# Patient Record
Sex: FEMALE | Race: WHITE | NOT HISPANIC OR LATINO | Employment: OTHER | URBAN - METROPOLITAN AREA
[De-identification: names, ages, dates, MRNs, and addresses within clinical notes are randomized per-mention and may not be internally consistent; named-entity substitution may affect disease eponyms.]

---

## 2017-01-12 ENCOUNTER — OFFICE VISIT (OUTPATIENT)
Dept: RETAIL CLINIC | Facility: CLINIC | Age: 62
End: 2017-01-12

## 2017-01-12 VITALS
SYSTOLIC BLOOD PRESSURE: 120 MMHG | HEART RATE: 68 BPM | RESPIRATION RATE: 20 BRPM | OXYGEN SATURATION: 98 % | TEMPERATURE: 97.1 F | DIASTOLIC BLOOD PRESSURE: 78 MMHG

## 2017-01-12 DIAGNOSIS — H65.03 BILATERAL ACUTE SEROUS OTITIS MEDIA, RECURRENCE NOT SPECIFIED: Primary | ICD-10-CM

## 2017-01-12 PROCEDURE — 99213 OFFICE O/P EST LOW 20 MIN: CPT | Performed by: NURSE PRACTITIONER

## 2017-01-12 RX ORDER — CETIRIZINE HYDROCHLORIDE 10 MG/1
10 TABLET ORAL DAILY
Qty: 14 TABLET | Refills: 0
Start: 2017-01-12 | End: 2017-01-26

## 2017-01-12 NOTE — PROGRESS NOTES
Subjective   Vero Pagan is a 61 y.o. female.     HPI Comments: Patient reports uncontrolled allergies intermittently over the last few months. Ear symptoms began 1 month ago. Has been diagnosed with serous otitis in the past and cannot tolerate fluticasone. Started taking unknown antihistamine last week and took it daily for 4-5 days without symptom relief so she stopped use. Also applied Vicks vapor rub into ear canals with a qtip without symptom relief. She is traveling via plane tomorrow and wanted to have her ears looked at before she left the country.    Earache    There is pain in both ears. This is a new problem. Episode onset: 1 month ago. The problem occurs constantly. The problem has been unchanged. There has been no fever. Associated symptoms include rhinorrhea. Pertinent negatives include no abdominal pain, coughing, diarrhea, ear discharge, headaches, hearing loss, neck pain, rash, sore throat or vomiting. Treatments tried: unknown antihistamine. The treatment provided no relief. There is no history of a chronic ear infection or hearing loss.       The following portions of the patient's history were reviewed and updated as appropriate: allergies, current medications, past family history, past medical history, past social history, past surgical history and problem list.    Review of Systems   Constitutional: Negative for appetite change, chills, diaphoresis, fatigue and fever.   HENT: Positive for congestion, ear pain and rhinorrhea. Negative for ear discharge, facial swelling, hearing loss, mouth sores, nosebleeds, postnasal drip, sinus pressure, sneezing, sore throat, tinnitus, trouble swallowing and voice change.    Eyes: Negative for pain, discharge, redness and itching.   Respiratory: Negative for cough, chest tightness, shortness of breath, wheezing and stridor.    Cardiovascular: Negative for chest pain and palpitations.   Gastrointestinal: Negative for abdominal pain, constipation, diarrhea,  nausea and vomiting.   Genitourinary: Negative for decreased urine volume.   Musculoskeletal: Negative for myalgias and neck pain.   Skin: Negative for rash.   Allergic/Immunologic: Positive for environmental allergies.   Neurological: Negative for dizziness, syncope, weakness and headaches.       Objective   Physical Exam   Constitutional: She is oriented to person, place, and time. She appears well-developed and well-nourished. She is cooperative.  Non-toxic appearance. She does not appear ill. No distress.   HENT:   Right Ear: Hearing, external ear and ear canal normal. Tympanic membrane is not scarred, not perforated, not erythematous, not retracted and not bulging. A middle ear effusion is present.   Left Ear: Hearing, external ear and ear canal normal. Tympanic membrane is not scarred, not perforated, not erythematous, not retracted and not bulging. A middle ear effusion is present.   Nose: Nose normal. Right sinus exhibits no maxillary sinus tenderness and no frontal sinus tenderness. Left sinus exhibits no maxillary sinus tenderness and no frontal sinus tenderness.   Mouth/Throat: Uvula is midline, oropharynx is clear and moist and mucous membranes are normal. Tonsils are 0 on the right. Tonsils are 0 on the left.   Eyes: Conjunctivae and lids are normal.   Cardiovascular: Normal rate, regular rhythm, S1 normal and S2 normal.    Pulmonary/Chest: Effort normal and breath sounds normal.   Abdominal: Soft. Normal appearance and bowel sounds are normal. There is no tenderness.   Lymphadenopathy:     She has no cervical adenopathy.   Neurological: She is alert and oriented to person, place, and time.   Skin: Skin is warm and dry. She is not diaphoretic. No pallor.   Vitals reviewed.      Assessment/Plan   Vero was seen today for earache.    Diagnoses and all orders for this visit:    Bilateral acute serous otitis media, recurrence not specified    Other orders  -     cetirizine (zyrTEC) 10 MG tablet; Take 1  tablet by mouth Daily for 14 days.        -     Follow up with PCP for persistent or worsening symptoms

## 2017-01-12 NOTE — PATIENT INSTRUCTIONS
Serous Otitis Media  Serous otitis media is fluid in the middle ear space. This space contains the bones for hearing and air. Air in the middle ear space helps to transmit sound.   The air gets there through the eustachian tube. This tube goes from the back of the nose (nasopharynx) to the middle ear space. It keeps the pressure in the middle ear the same as the outside world. It also helps to drain fluid from the middle ear space.  CAUSES   Serous otitis media occurs when the eustachian tube gets blocked. Blockage can come from:  · Ear infections.  · Colds and other upper respiratory infections.  · Allergies.  · Irritants such as cigarette smoke.  · Sudden changes in air pressure (such as descending in an airplane).  · Enlarged adenoids.  · A mass in the nasopharynx.  During colds and upper respiratory infections, the middle ear space can become temporarily filled with fluid. This can happen after an ear infection also. Once the infection clears, the fluid will generally drain out of the ear through the eustachian tube. If it does not, then serous otitis media occurs.  SIGNS AND SYMPTOMS   · Hearing loss.  · A feeling of fullness in the ear, without pain.  · Young children may not show any symptoms but may show slight behavioral changes, such as agitation, ear pulling, or crying.  DIAGNOSIS   Serous otitis media is diagnosed by an ear exam. Tests may be done to check on the movement of the eardrum. Hearing exams may also be done.  TREATMENT   The fluid most often goes away without treatment. If allergy is the cause, allergy treatment may be helpful. Fluid that persists for several months may require minor surgery. A small tube is placed in the eardrum to:  · Drain the fluid.  · Restore the air in the middle ear space.  In certain situations, antibiotic medicines are used to avoid surgery. Surgery may be done to remove enlarged adenoids (if this is the cause).  HOME CARE INSTRUCTIONS   · Keep children away from  tobacco smoke.  · Keep all follow-up visits as directed by your health care provider.  SEEK MEDICAL CARE IF:   · Your hearing is not better in 3 months.  · Your hearing is worse.  · You have ear pain.  · You have drainage from the ear.  · You have dizziness.  · You have serous otitis media only in one ear or have any bleeding from your nose (epistaxis).  · You notice a lump on your neck.  MAKE SURE YOU:  · Understand these instructions.    · Will watch your condition.    · Will get help right away if you are not doing well or get worse.       This information is not intended to replace advice given to you by your health care provider. Make sure you discuss any questions you have with your health care provider.     Document Released: 03/09/2005 Document Revised: 01/08/2016 Document Reviewed: 07/15/2014  [employer] Interactive Patient Education ©2016 [employer]

## 2017-03-06 ENCOUNTER — APPOINTMENT (OUTPATIENT)
Dept: WOMENS IMAGING | Facility: HOSPITAL | Age: 62
End: 2017-03-06

## 2017-03-06 PROCEDURE — 77067 SCR MAMMO BI INCL CAD: CPT | Performed by: RADIOLOGY

## 2017-03-06 PROCEDURE — 77063 BREAST TOMOSYNTHESIS BI: CPT | Performed by: RADIOLOGY

## 2017-08-10 ENCOUNTER — OFFICE VISIT (OUTPATIENT)
Dept: RETAIL CLINIC | Facility: CLINIC | Age: 62
End: 2017-08-10

## 2017-08-10 VITALS
TEMPERATURE: 98.8 F | RESPIRATION RATE: 18 BRPM | DIASTOLIC BLOOD PRESSURE: 80 MMHG | SYSTOLIC BLOOD PRESSURE: 140 MMHG | HEART RATE: 72 BPM | OXYGEN SATURATION: 98 %

## 2017-08-10 DIAGNOSIS — H66.002 ACUTE SUPPURATIVE OTITIS MEDIA OF LEFT EAR WITHOUT SPONTANEOUS RUPTURE OF TYMPANIC MEMBRANE, RECURRENCE NOT SPECIFIED: Primary | ICD-10-CM

## 2017-08-10 PROBLEM — H92.02 LEFT EAR PAIN: Status: ACTIVE | Noted: 2017-08-10

## 2017-08-10 PROCEDURE — 99213 OFFICE O/P EST LOW 20 MIN: CPT | Performed by: NURSE PRACTITIONER

## 2017-08-10 RX ORDER — AMOXICILLIN 875 MG/1
875 TABLET, COATED ORAL 2 TIMES DAILY
Qty: 20 TABLET | Refills: 0 | Status: SHIPPED | OUTPATIENT
Start: 2017-08-10 | End: 2017-09-11

## 2017-08-10 NOTE — PROGRESS NOTES
Subjective   Vero Pagan is a 62 y.o. female.     Earache    There is pain in the left ear. This is a new problem. The current episode started in the past 7 days. The maximum temperature recorded prior to her arrival was 100.4 - 100.9 F. The fever has been present for 1 to 2 days. The pain is at a severity of 5/10. The pain is moderate. Pertinent negatives include no coughing or hearing loss. Associated symptoms comments: Left ear muffled hearing. She has tried nothing for the symptoms.        The following portions of the patient's history were reviewed and updated as appropriate: allergies, current medications, past family history, past medical history, past social history, past surgical history and problem list.    Review of Systems   Constitutional: Positive for fever.   HENT: Positive for ear pain and postnasal drip. Negative for hearing loss.         Left ear    Eyes: Negative.    Respiratory: Negative.  Negative for cough.    Cardiovascular: Negative.    Gastrointestinal: Negative.        Objective   Physical Exam   Constitutional: She appears well-developed and well-nourished.   HENT:   Right Ear: External ear normal.   Left Ear: Tympanic membrane is injected, erythematous and bulging. Tympanic membrane mobility is abnormal.   Eyes: Pupils are equal, round, and reactive to light.   Neck: Normal range of motion.   Cardiovascular: Normal rate and normal heart sounds.    Pulmonary/Chest: Effort normal and breath sounds normal. She has no decreased breath sounds. She has no wheezes. She has no rhonchi. She has no rales.   Abdominal: Soft. Bowel sounds are normal.   Nursing note and vitals reviewed.      Assessment/Plan   Vero was seen today for earache and fever.    Diagnoses and all orders for this visit:    Acute suppurative otitis media of left ear without spontaneous rupture of tympanic membrane, recurrence not specified  -     amoxicillin (AMOXIL) 875 MG tablet; Take 1 tablet by mouth 2 (Two) Times a  Day.      Talked to the patient about the diagnosis and educate the patient and advise to visit to PCP if the symptoms worsens

## 2017-08-10 NOTE — PATIENT INSTRUCTIONS

## 2017-09-11 ENCOUNTER — OFFICE VISIT (OUTPATIENT)
Dept: ORTHOPEDIC SURGERY | Facility: CLINIC | Age: 62
End: 2017-09-11

## 2017-09-11 VITALS
WEIGHT: 215 LBS | HEIGHT: 70 IN | BODY MASS INDEX: 30.78 KG/M2 | HEART RATE: 72 BPM | DIASTOLIC BLOOD PRESSURE: 88 MMHG | SYSTOLIC BLOOD PRESSURE: 143 MMHG

## 2017-09-11 DIAGNOSIS — S69.82XA TFCC (TRIANGULAR FIBROCARTILAGE COMPLEX) INJURY, LEFT, INITIAL ENCOUNTER: Primary | ICD-10-CM

## 2017-09-11 PROCEDURE — 99203 OFFICE O/P NEW LOW 30 MIN: CPT | Performed by: NURSE PRACTITIONER

## 2017-09-11 PROCEDURE — 20605 DRAIN/INJ JOINT/BURSA W/O US: CPT | Performed by: NURSE PRACTITIONER

## 2017-09-11 RX ADMIN — BUPIVACAINE HYDROCHLORIDE 1 ML: 5 INJECTION, SOLUTION EPIDURAL; INTRACAUDAL at 15:43

## 2017-09-11 RX ADMIN — BETAMETHASONE SODIUM PHOSPHATE AND BETAMETHASONE ACETATE 9 MG: 3; 3 INJECTION, SUSPENSION INTRA-ARTICULAR; INTRALESIONAL; INTRAMUSCULAR; SOFT TISSUE at 15:43

## 2017-09-11 RX ADMIN — LIDOCAINE HYDROCHLORIDE 1 ML: 10 INJECTION, SOLUTION EPIDURAL; INFILTRATION; INTRACAUDAL; PERINEURAL at 15:43

## 2017-09-11 NOTE — PROGRESS NOTES
Subjective:     Patient ID: Vero Pagan is a 62 y.o. female.    Chief Complaint: Left wrist injury, 09/05/2017    History of Present Illness    Ms. Pagan is a 62-year-old female who presents with a one-week history of left wrist pain.  She was injured when her dog tried to tell her down after jumping over her shoulder which caused her wrist ago and flexed position.  She has been seen by her primary care and x-rays are completed in the office.  She does have x-rays present with her at this time.  Pain present at the ulnar side of her left wrist increased with all range of motion activities.  Denies presence of numbness and tingling at left upper extremity.  She has had a wrist fracture at age 11 on the radial and ulnar side however denies that she has had pain since the initial fracture.  She has been in a wrist immobilizer for the last week has seemed to help decrease the pain at her left wrist.  Denies other concerns present this time.     Social History     Occupational History   • Not on file.     Social History Main Topics   • Smoking status: Former Smoker   • Smokeless tobacco: Never Used      Comment: caffeine use   • Alcohol use Yes      Comment: being a social drinker   • Drug use: No   • Sexual activity: Defer      Past Medical History:   Diagnosis Date   • Abdominal pain, RUQ    • Acute sinusitis    • Allergic    • Anterior chest wall pain    • Benign essential hypertension    • Chest pain    • Dizziness    • Fibrocystic disease of breast    • Fracture of wrist    • History of bone density study     osteopenia in left hip   • History of fibrocystic disease of breast    • History of hypertension    • History of mammogram    • History of menopause    • History of type 2 diabetes mellitus    • History of vitamin D deficiency    • Lower back pain    • Lumbosacral disc disease    • Lump or mass in breast    • Menopausal symptoms    • Plantar fasciitis of left foot    • Thumb pain    • Type 2 diabetes mellitus   "  • Urinary frequency    • Vertigo    • Vitamin D deficiency disease      Past Surgical History:   Procedure Laterality Date   • BACK SURGERY     • COLONOSCOPY      normal with polyps   • HYSTERECTOMY     • PAP SMEAR      normal   • TONSILLECTOMY      x2       Family History   Problem Relation Age of Onset   • Heart disease Mother    • Cancer Mother      lung cancer   • Heart disease Father    • Diabetes Other    • Heart disease Other          Review of Systems   Constitutional: Negative for chills, diaphoresis, fever and unexpected weight change.   HENT: Negative for hearing loss, nosebleeds, sore throat and tinnitus.    Eyes: Positive for pain. Negative for visual disturbance.   Respiratory: Negative for cough, shortness of breath and wheezing.    Cardiovascular: Negative for chest pain and palpitations.   Gastrointestinal: Negative for abdominal pain, diarrhea, nausea and vomiting.   Endocrine: Negative for cold intolerance, heat intolerance and polydipsia.   Genitourinary: Negative for difficulty urinating, dysuria and hematuria.   Musculoskeletal: Negative for arthralgias, joint swelling and myalgias.   Skin: Negative for rash and wound.   Allergic/Immunologic: Negative for environmental allergies.   Neurological: Positive for dizziness. Negative for syncope and numbness.   Hematological: Does not bruise/bleed easily.   Psychiatric/Behavioral: Negative for dysphoric mood and sleep disturbance. The patient is not nervous/anxious.            Objective:  Physical Exam    Vital signs reviewed.   General: No acute distress.  Eyes: conjunctiva clear; pupils equally round and reactive  ENT: external ears and nose atraumatic; oropharynx clear  CV: no peripheral edema  Resp: normal respiratory effort  Skin: no rashes or wounds; normal turgor  Psych: mood and affect appropriate; recent and remote memory intact    Vitals:    09/11/17 1519   BP: 143/88   Pulse: 72   Weight: 215 lb (97.5 kg)   Height: 70\" (177.8 cm) "     Last 2 weights    09/11/17  1519   Weight: 215 lb (97.5 kg)     Body mass index is 30.85 kg/(m^2).     Left Hand Exam     Tenderness   The patient is experiencing tenderness in the ulnar area.     Range of Motion     Wrist   Extension: 45   Flexion: 80   Pronation: 80   Supination: 80     Muscle Strength   Wrist Extension: 4/5   Wrist Flexion: 4/5   :  4/5     Tests   Tinel’s Sign (Medial Nerve): negative    Other   Erythema: absent  Scars: absent  Sensation: normal  Pulse: present    Comments:  Mild edema           Imaging:  Reviewed x-ray completed outside facility:  Previous, chronic fracture of ulnar styloid noted, no acute fracture noted     Assessment:       1. TFCC (triangular fibrocartilage complex) injury, left, initial encounter          Plan:  1.  Discussed plan of care with patient.  Wishes to proceed with corticosteroid injection of her ulnar aspect of her left wrist.  2.  Fitted for soft wrist immobilizer encouraged to avoid lifting activities.  She can remove the splint at night for comfort but needs to use when awake during the day.  3.  We'll plan to follow up with her in 3 weeks.  If not improving we will have her follow-up with occupational therapy to time.  Patient verbalizes understanding of all information agrees with plan of care.  Denies other concerns present this time.    Medium Joint Arthrocentesis  Date/Time: 9/11/2017 3:43 PM  Consent given by: patient  Site marked: site marked  Timeout: Immediately prior to procedure a time out was called to verify the correct patient, procedure, equipment, support staff and site/side marked as required   Supporting Documentation  Indications: pain   Procedure Details  Location: wrist - Wrist joint: Left ulna side   Needle size: 22 G  Medications administered: 1 mL lidocaine PF 1% 1 %; 1 mL bupivacaine (PF) 0.5 %; 9 mg betamethasone acetate-betamethasone sodium phosphate 6 (3-3) MG/ML  Patient tolerance: patient tolerated the procedure well with  no immediate complications          Orders:  Orders Placed This Encounter   Procedures   • Medium Joint Arthrocentesis     YOLIS query complete.    Dragon transcription disclaimer     Much of this encounter note is an electronic transcription/translation of spoken language to printed text. The electronic translation of spoken language may permit erroneous, or at times, nonsensical words or phrases to be inadvertently transcribed. Although I have reviewed the note for such errors, some may still exist.

## 2017-09-12 PROBLEM — S69.80XA TFCC (TRIANGULAR FIBROCARTILAGE COMPLEX) INJURY: Status: ACTIVE | Noted: 2017-09-12

## 2017-09-12 RX ORDER — BUPIVACAINE HYDROCHLORIDE 5 MG/ML
1 INJECTION, SOLUTION EPIDURAL; INTRACAUDAL
Status: COMPLETED | OUTPATIENT
Start: 2017-09-11 | End: 2017-09-11

## 2017-09-12 RX ORDER — BETAMETHASONE SODIUM PHOSPHATE AND BETAMETHASONE ACETATE 3; 3 MG/ML; MG/ML
9 INJECTION, SUSPENSION INTRA-ARTICULAR; INTRALESIONAL; INTRAMUSCULAR; SOFT TISSUE
Status: COMPLETED | OUTPATIENT
Start: 2017-09-11 | End: 2017-09-11

## 2017-09-12 RX ORDER — LIDOCAINE HYDROCHLORIDE 10 MG/ML
1 INJECTION, SOLUTION EPIDURAL; INFILTRATION; INTRACAUDAL; PERINEURAL
Status: COMPLETED | OUTPATIENT
Start: 2017-09-11 | End: 2017-09-11

## 2017-10-13 ENCOUNTER — OFFICE VISIT (OUTPATIENT)
Dept: ORTHOPEDIC SURGERY | Facility: CLINIC | Age: 62
End: 2017-10-13

## 2017-10-13 VITALS — BODY MASS INDEX: 29.82 KG/M2 | HEIGHT: 71 IN | WEIGHT: 213 LBS

## 2017-10-13 DIAGNOSIS — S69.82XD INJURY OF TRIANGULAR FIBROCARTILAGE COMPLEX (TFCC) OF LEFT WRIST, SUBSEQUENT ENCOUNTER: Primary | ICD-10-CM

## 2017-10-13 PROCEDURE — 99212 OFFICE O/P EST SF 10 MIN: CPT | Performed by: NURSE PRACTITIONER

## 2017-10-13 NOTE — PROGRESS NOTES
Subjective:     Patient ID: Vero Pagan is a 62 y.o. female.    Chief Complaint: Follow-up left wrist injury, September 5, 2017    History of Present Illness    Ms. Pagan presents back to clinic for 3 week follow-up of left wrist pain.  Reports that she was received strengthening and range of motion activities from her niece who is an occupational therapist and has been completing the activities as instructed.  Reports that she was initially improving and doing very well removed her brace began doing activities of daily lifting with her left wrist and began noticing increased pain.  Since then she has been set back to phase I of her therapy program and now reports that she is back at the beginning or she was and she initially presented.  Again pain mainly present over the ulnar aspect of her left wrist increased with flexion and extension in all range of motion activities.  Reports that she is unable to lift anything due to the pain she experiences her left wrist.  Denies presence of numbness and tingling at left wrist and all aspects of left upper extremity.  Denies other concerns present this time.     Social History     Occupational History   • Not on file.     Social History Main Topics   • Smoking status: Former Smoker   • Smokeless tobacco: Never Used      Comment: caffeine use   • Alcohol use Yes      Comment: being a social drinker   • Drug use: No   • Sexual activity: Defer      Past Medical History:   Diagnosis Date   • Abdominal pain, RUQ    • Acute sinusitis    • Allergic    • Anterior chest wall pain    • Benign essential hypertension    • Chest pain    • Dizziness    • Fibrocystic disease of breast    • Fracture of wrist    • History of bone density study     osteopenia in left hip   • History of fibrocystic disease of breast    • History of hypertension    • History of mammogram    • History of menopause    • History of type 2 diabetes mellitus    • History of vitamin D deficiency    • Lower back  pain    • Lumbosacral disc disease    • Lump or mass in breast    • Menopausal symptoms    • Plantar fasciitis of left foot    • Thumb pain    • Type 2 diabetes mellitus    • Urinary frequency    • Vertigo    • Vitamin D deficiency disease      Past Surgical History:   Procedure Laterality Date   • BACK SURGERY     • COLONOSCOPY      normal with polyps   • HYSTERECTOMY     • PAP SMEAR      normal   • TONSILLECTOMY      x2       Family History   Problem Relation Age of Onset   • Heart disease Mother    • Cancer Mother      lung cancer   • Heart disease Father    • Diabetes Other    • Heart disease Other          Review of Systems   Constitutional: Negative for chills, diaphoresis, fever and unexpected weight change.   HENT: Negative for hearing loss, nosebleeds, sore throat and tinnitus.    Eyes: Negative for pain and visual disturbance.   Respiratory: Negative for cough, shortness of breath and wheezing.    Cardiovascular: Negative for chest pain and palpitations.   Gastrointestinal: Negative for abdominal pain, diarrhea, nausea and vomiting.   Endocrine: Negative for cold intolerance, heat intolerance and polydipsia.   Genitourinary: Negative for difficulty urinating, dysuria and hematuria.   Musculoskeletal: Positive for arthralgias and myalgias. Negative for joint swelling.   Skin: Negative for rash and wound.   Allergic/Immunologic: Negative for environmental allergies.   Neurological: Negative for dizziness, syncope and numbness.   Hematological: Does not bruise/bleed easily.   Psychiatric/Behavioral: Negative for dysphoric mood and sleep disturbance. The patient is not nervous/anxious.            Objective:  Physical Exam    General: No acute distress.  Eyes: conjunctiva clear; pupils equally round and reactive  ENT: external ears and nose atraumatic; oropharynx clear  CV: no peripheral edema  Resp: normal respiratory effort  Skin: no rashes or wounds; normal turgor  Psych: mood and affect appropriate; recent  "and remote memory intact    Vitals:    10/13/17 1443   Weight: 213 lb (96.6 kg)   Height: 71\" (180.3 cm)     Last 2 weights    10/13/17  1443   Weight: 213 lb (96.6 kg)     Body mass index is 29.71 kg/(m^2).     Left Hand Exam     Tenderness   The patient is experiencing tenderness in the ulnar area.     Range of Motion     Wrist   Extension: 15   Flexion: 30   Pronation: 60   Supination: 60     Muscle Strength   Wrist Extension: 4/5   Wrist Flexion: 4/5   :  5/5     Tests   Tinel’s Sign (Medial Nerve): negative    Other   Erythema: absent  Scars: absent  Sensation: normal  Pulse: present          Assessment:       1. Injury of triangular fibrocartilage complex (TFCC) of left wrist, subsequent encounter          Plan:  1.  Discussed plan of care with patient.  She is to continue to work with her home strengthening activities that was provided by her niece.  Encouraged her to continue wearing the wrist brace at night and with all activities which include lifting and wrist range of motion.  2.  Discussed with patient in 3 weeks if she is not improving we need to get her into occupational therapy for range of motion and wrist strengthening.  She is also been encouraged to use moist heat prior to exercises.  Patient verbalized understanding of all information agrees with plan of care.  Denies other concerns present this time.  Orders:  No orders of the defined types were placed in this encounter.    Dragon transcription disclaimer     Much of this encounter note is an electronic transcription/translation of spoken language to printed text. The electronic translation of spoken language may permit erroneous, or at times, nonsensical words or phrases to be inadvertently transcribed. Although I have reviewed the note for such errors, some may still exist.  "

## 2017-10-30 ENCOUNTER — TELEPHONE (OUTPATIENT)
Dept: ORTHOPEDIC SURGERY | Facility: CLINIC | Age: 62
End: 2017-10-30

## 2017-10-30 DIAGNOSIS — S69.82XD INJURY OF TRIANGULAR FIBROCARTILAGE COMPLEX (TFCC) OF LEFT WRIST, SUBSEQUENT ENCOUNTER: Primary | ICD-10-CM

## 2017-10-30 NOTE — TELEPHONE ENCOUNTER
Patient calling about her left wrist, she states the pain is really no better and that you may order an ultrasound?    Her call back number today is [phone] (work number).    Thanks.

## 2017-10-30 NOTE — TELEPHONE ENCOUNTER
We discussed at her last visit for referral to OT where they can complete US for heat. I will gladly place the referral. Do you mind to ask her where she would like to go? We can always complete that at our outpatient facility her at hospital. Thank you

## 2017-11-02 ENCOUNTER — TELEPHONE (OUTPATIENT)
Dept: ORTHOPEDIC SURGERY | Facility: CLINIC | Age: 62
End: 2017-11-02

## 2017-11-03 ENCOUNTER — HOSPITAL ENCOUNTER (OUTPATIENT)
Dept: OCCUPATIONAL THERAPY | Facility: HOSPITAL | Age: 62
Setting detail: THERAPIES SERIES
Discharge: HOME | End: 2017-11-03

## 2017-11-03 DIAGNOSIS — S69.82XD INJURY OF TRIANGULAR FIBROCARTILAGE COMPLEX (TFCC) OF LEFT WRIST, SUBSEQUENT ENCOUNTER: Primary | ICD-10-CM

## 2017-11-03 PROCEDURE — 97165 OT EVAL LOW COMPLEX 30 MIN: CPT

## 2017-11-03 NOTE — THERAPY EVALUATION
Outpatient Occupational Therapy Ortho Initial Evaluation  [facility]     Patient Name: Vero Pagan  : 1955  MRN: [id number]  Today's Date: 11/3/2017      Visit Date: 2017    Patient Active Problem List   Diagnosis   • Right upper quadrant pain   • Anterior chest wall pain   • Benign essential hypertension   • Chest pain   • Controlled type 2 diabetes mellitus without complication   • Dizziness   • Otalgia   • Fibrocystic breast changes   • Hyperlipidemia   • Low back pain   • Mass of breast   • Menopausal symptom   • Thumb pain   • Increased frequency of urination   • Vertigo   • Left ear pain   • TFCC (triangular fibrocartilage complex) injury        Past Medical History:   Diagnosis Date   • Abdominal pain, RUQ    • Acute sinusitis    • Allergic    • Anterior chest wall pain    • Benign essential hypertension    • Chest pain    • Dizziness    • Fibrocystic disease of breast    • Fracture of wrist    • History of bone density study     osteopenia in left hip   • History of fibrocystic disease of breast    • History of hypertension    • History of mammogram    • History of menopause    • History of type 2 diabetes mellitus    • History of vitamin D deficiency    • Lower back pain    • Lumbosacral disc disease    • Lump or mass in breast    • Menopausal symptoms    • Plantar fasciitis of left foot    • Thumb pain    • Type 2 diabetes mellitus    • Urinary frequency    • Vertigo    • Vitamin D deficiency disease         Past Surgical History:   Procedure Laterality Date   • BACK SURGERY     • COLONOSCOPY      normal with polyps   • HYSTERECTOMY     • PAP SMEAR      normal   • TONSILLECTOMY      x2         Visit Dx:    ICD-10-CM ICD-9-CM   1. Injury of triangular fibrocartilage complex (TFCC) of left wrist, subsequent encounter S69.82XD V58.89             Patient History       17 1300          History    Date Current Problem(s) Began 17  -EN      Brief Description of Current Complaint  Patient fractured ulnar styloid early September after dog jumped on her pushing her wrist in full flexion.. The fracture was thought to be chronic.  The TFCC was also injured and patient reports significant pain with loaded extension, forearm rotation, and all lifting.    -EN      Previous treatment for THIS PROBLEM Injections  -EN      Patient/Caregiver Goals Relieve pain;Improve strength;Return to prior level of function  -EN      Current Tobacco Use no  -EN      Patient's Rating of General Health Good  -EN      Hand Dominance right-handed  -EN      Occupation/sports/leisure activities , types a lot  -EN      How has patient tried to help current problem? use of heat and ice.  Reports she has been doing some exercises.  -EN      What clinical tests have you had for this problem? X-ray  -EN      Results of Clinical Tests Patient found to have a fracture involving the ulnar styloid with separation of a 5 mm bone fragment (fracture likely chronic).  -EN      Are you or can you be pregnant No  -EN      Pain     Pain Location Wrist  -EN      Pain at Present 6  -EN      Pain at Best --   5-6  -EN      Pain at Worst 9  -EN      Pain Frequency Constant/continuous  -EN      Pain Description Aching  -EN      Pain Comments Patient describes the pain as constant aching.  -EN      Fall Risk Assessment    Any falls in the past year: Yes  -EN      Number of falls reported in the last 12 months 1   tripped on family members O2 line, no injury  -EN      Daily Activities    Primary Language English  -EN      Are you able to read Yes  -EN      Are you able to write Yes  -EN      How does patient learn best? Listening;Demonstration;Pictures/Video  -EN      Teaching needs identified Home Exercise Program;Management of Condition  -EN      Patient is concerned about/has problems with Grasping objects lifting;Performing job responsibilities/community activities (work, school,  -EN      Does patient have problems with the  following? Depression  -EN      Barriers to learning None  -EN      Explanation of Functional Status Problem Patient reports pain with any activity involving lifting, forearm rotation, and loaded extension.  -EN      Pt Participated in POC and Goals Yes  -EN      Safety    Are you being hurt, hit, or frightened by anyone at home or in your life? No  -EN      Are you being neglected by a caregiver No  -EN        User Key  (r) = Recorded By, (t) = Taken By, (c) = Cosigned By    Initials Name Provider Type    KARAN De La Paz Occupational Therapist                OT Ortho       11/03/17 1300          Subjective Comments    Subjective Comments Patient reports she has increased pain with loaded extension, forearm rotation and any lifting.  Patient reports she was doing well and wearing the brace non-stop and about a week ago slept without the brace and has had increased pain since. Patient reports she has misplaced the brace and is now wearing a different brace which she feels isn't helping as much.    -EN      Subjective Pain    Able to rate subjective pain? yes  -EN      Posture/Observations    Observations Edema  -EN      Posture/Observations Comments edema noted at ulnar wrist  -EN      Sensation    Sensation WNL? WNL  -EN      Light Touch No apparent deficits  -EN      Body Part    Body Part Wrist/Hand  -EN      Left Elbow/Forearm    Supination AROM Deficit 88  -EN      Pronation AROM Deficit 89   severe pain with pronation  -EN      Right Elbow/Forearm    Supination AROM Deficit wnl  -EN      Pronation AROM Deficit wnl  -EN      Left Wrist    Flexion AROM Deficit 27   pain 6/10 with arom of wrist  -EN      Extension AROM Deficit 29   pain 6/10 with arom of wrist  -EN      Ulnar Deviation PROM Deficit 13   pain 6/10 with arom of wrist  -EN      Radial Deviation AROM Deficit 17   pain 6/10 with arom of wrist  -EN      Right Wrist    Flexion AROM Deficit 85  -EN      Extension AROM Deficit 72  -EN       Ulnar Deviation AROM Deficit 32  -EN      Radial Deviation AROM Deficit 24  -EN      RUE Edema - Circumference (cm)    Wrist Crease 17.3 cm  -EN      LUE Edema - Circumference (cm)    Wrist Crease 16.8 cm   edema noted proxmial to ulnar styloid  -EN        User Key  (r) = Recorded By, (t) = Taken By, (c) = Cosigned By    Initials Name Provider Type    KARAN De La Paz Occupational Therapist             Hand Therapy (last 24 hours)      Hand Eval       11/03/17 1300           Strength Right    # Reps 1  -EN      Right Rung 2  -EN      Right  Test 1 65  -EN       Strength Average Right 65  -EN       Strength Left    # Reps 1  -EN      Left Rung 2  -EN      Left  Test 1 10  -EN       Strength Average Left 10  -EN      Right Hand Strength - Pinch (lbs)    Lateral 19 lbs  -EN      Left Hand Strength - Pinch (lbs)    Lateral 5 lbs  -EN      Therapy Education    Given HEP;Symptoms/condition management;Pain management;Edema management  -EN      Program New  -EN      How Provided Verbal;Demonstration  -EN      Provided to Patient  -EN      Level of Understanding Teach back education performed;Verbalized;Demonstrated  -EN        User Key  (r) = Recorded By, (t) = Taken By, (c) = Cosigned By    Initials Name Provider Type    KARAN De La Paz Occupational Therapist                        OT Goals       11/03/17 1400       OT Short Term Goals    STG Date to Achieve 11/17/17  -EN     STG 1 Patient to improve left active wrist extension by 10 degrees for improved independence with ADL/IADL tasks.  -EN     STG 1 Progress Comments Current active wrist extension is 29 degrees  -EN     STG 2 Patient to improve left active wrist flexion by 10 degrees for improved ADL/IADL independence.  -EN     STG 2 Progress Comments current active wrist flexion is 27 degrees  -EN     STG 3 Patient to report decreased wrist pain at rest by 50%.  -EN     STG 3 Progress Comments current pain at rest 6/10   -EN     STG 4 Patient to improve left  strength by 5 # for improved grasp of utensils.  -EN     STG 4 Progress Comments current left  strength is 10#  -EN     STG 5 Patient to demonstrate independence with HEP including edema management, don/doffing widget brace, compensatory strategies for decreased pain, etc...  -EN     Long Term Goals    LTG Date to Achieve 12/01/17  -EN     LTG 1 Patient to demonstrate WFL left wrist AROM.  -EN     LTG 2 Patient to report pain with activity decreased by 75%.  -EN     LTG 3 Patient to demonstate independence with home strengthening program.  -EN       User Key  (r) = Recorded By, (t) = Taken By, (c) = Cosigned By    Initials Name Provider Type    ROBBY Keen OTR Occupational Therapist                OT Assessment/Plan       11/03/17 0832       OT Assessment    Functional Limitations Limitation in home management;Performance in leisure activities;Performance in self-care ADL;Performance in sport activities;Performance in work activities  -EN     Impairments Edema;Range of motion;Muscle strength;Pain  -EN     Assessment Comments Patient presents with left ulnar wrist pain, decreased wrist AROM, decreased strength, and wrist edema following a ulnar styloid fracture (with separation of a 5 mm bone fragment which was thought to be chronic) and injury to the TFCC.  Patient reports pain 6/10 at rest and up to 9/10 with movement, expecially loaded extension, supination/pronation, and any activity involving lifting.  Patient would benefit from OT to address edema, pain, ROM and strength, and education on bracing, compensatory strategies, etc...  -EN     Please refer to paper survey for additional self-reported information Yes  -EN     OT Rehab Potential Good  -EN     Patient/caregiver participated in establishment of treatment plan and goals Yes  -EN     Patient would benefit from skilled therapy intervention Yes  -EN     OT Plan    OT Frequency 2x/week  -EN      Predicted Duration of Therapy Intervention (days/wks) 4  -EN     Planned CPT's? OT EVAL LOW COMPLEXITY: 87511;OT THER ACT EA 15 MIN: [id number];OT HOT/COLD PACK;OT ULTRASOUND EA 15 MIN: 61894  -EN     Planned Therapy Interventions (Optional Details) home exercise program;patient/family education;orthotic fitting/training;ROM (Range of Motion);strengthening  -EN     OT Plan Comments Continue with goals.  -EN       User Key  (r) = Recorded By, (t) = Taken By, (c) = Cosigned By    Initials Name Provider Type    KARAN De La Paz Occupational Therapist                 OT Exercises       11/03/17 1300          Exercise 1    Exercise Name 1 retrograde edema massage  -EN      Exercise 2    Exercise Name 2 AROM exercises- wrist flexion/extension, ulnar deviation/radial deviation  -EN      Cueing 2 Verbal;Demo;Tactile  -EN      Exercise 3    Exercise Name 3 forearm supination/pronation  -EN      Cueing 3 Verbal;Tactile  -EN      Exercise 4    Exercise Name 4 Education  -EN        User Key  (r) = Recorded By, (t) = Taken By, (c) = Cosigned By    Initials Name Provider Type    KARAN De La Paz Occupational Therapist                    Outcome Measures       11/03/17 1300          Quick DASH    Open a tight or new jar. 5  -EN      Do heavy household chores (e.g., wash walls, wash floors) 4  -EN      Carry a shopping bag or briefcase 4  -EN      Wash your back 4  -EN      Use a knife to cut food 4  -EN      Recreational activities in which you take some force or impact through your arm, should or hand (e.g. golf, hammering, tennis, etc.) 5  -EN      During the past week, to what extent has your arm, shoulder, or hand problem interfered with your normal social activites with family, friends, neighbors or groups? 5  -EN      During the past week, were you limited in your work or other regular daily activities as a result of your arm, shoulder or hand problem? 5  -EN      Arm, Shoulder, or hand pain 4  -EN       Tingling (pins and needles) in your arm, shoulder, or hand 1  -EN      During the past week, how much difficulty have you had sleeping because of the pain in your arm, shoulder or hand? 2  -EN      Number of Questions Answered 11  -EN      Quick DASH Score 72.73  -EN      Functional Assessment    Outcome Measure Options Quick DASH  -EN        User Key  (r) = Recorded By, (t) = Taken By, (c) = Cosigned By    Initials Name Provider Type    EN Corinne Keen OTR Occupational Therapist            Time Calculation:   OT Start Time: 1300  OT Stop Time: 1400  OT Time Calculation (min): 60 min     Therapy Charges for Today     Code Description Service Date Service Provider Modifiers Qty    24536151161 HC OT HOT OR COLD PACK TREAT [facility] 11/3/2017 KARAN Bolivar GO 1    13595031302 HC OT EVAL LOW COMPLEXITY 2 11/3/2017 KARAN Bolivar GO 1                  KARAN Hayes  11/3/2017

## 2017-11-06 ENCOUNTER — APPOINTMENT (OUTPATIENT)
Dept: OCCUPATIONAL THERAPY | Facility: HOSPITAL | Age: 62
End: 2017-11-06

## 2017-11-06 ENCOUNTER — HOSPITAL ENCOUNTER (OUTPATIENT)
Dept: OCCUPATIONAL THERAPY | Facility: HOSPITAL | Age: 62
Setting detail: THERAPIES SERIES
Discharge: HOME | End: 2017-11-06

## 2017-11-06 DIAGNOSIS — S69.82XD INJURY OF TRIANGULAR FIBROCARTILAGE COMPLEX (TFCC) OF LEFT WRIST, SUBSEQUENT ENCOUNTER: Primary | ICD-10-CM

## 2017-11-06 PROCEDURE — 97035 APP MDLTY 1+ULTRASOUND EA 15: CPT

## 2017-11-06 PROCEDURE — 97530 THERAPEUTIC ACTIVITIES: CPT

## 2017-11-06 NOTE — THERAPY TREATMENT NOTE
Outpatient Occupational Therapy Ortho Treatment Note  [facility]     Patient Name: Vero Pagan  : 1955  MRN: [id number]  Today's Date: 2017        Visit Date: 2017    Patient Active Problem List   Diagnosis   • Right upper quadrant pain   • Anterior chest wall pain   • Benign essential hypertension   • Chest pain   • Controlled type 2 diabetes mellitus without complication   • Dizziness   • Otalgia   • Fibrocystic breast changes   • Hyperlipidemia   • Low back pain   • Mass of breast   • Menopausal symptom   • Thumb pain   • Increased frequency of urination   • Vertigo   • Left ear pain   • TFCC (triangular fibrocartilage complex) injury        Past Medical History:   Diagnosis Date   • Abdominal pain, RUQ    • Acute sinusitis    • Allergic    • Anterior chest wall pain    • Benign essential hypertension    • Chest pain    • Dizziness    • Fibrocystic disease of breast    • Fracture of wrist    • History of bone density study     osteopenia in left hip   • History of fibrocystic disease of breast    • History of hypertension    • History of mammogram    • History of menopause    • History of type 2 diabetes mellitus    • History of vitamin D deficiency    • Lower back pain    • Lumbosacral disc disease    • Lump or mass in breast    • Menopausal symptoms    • Plantar fasciitis of left foot    • Thumb pain    • Type 2 diabetes mellitus    • Urinary frequency    • Vertigo    • Vitamin D deficiency disease         Past Surgical History:   Procedure Laterality Date   • BACK SURGERY     • COLONOSCOPY      normal with polyps   • HYSTERECTOMY     • PAP SMEAR      normal   • TONSILLECTOMY      x2         Visit Dx:    ICD-10-CM ICD-9-CM   1. Injury of triangular fibrocartilage complex (TFCC) of left wrist, subsequent encounter S69.82XD V58.89             OT Ortho       17 1300          Left Wrist    Flexion AROM Deficit 38  -EN      Extension AROM Deficit 55  -EN        User Key  (r) = Recorded  By, (t) = Taken By, (c) = Cosigned By    Initials Name Provider Type    KARAN De La Paz Occupational Therapist             Hand Therapy (last 24 hours)      Hand Eval       11/06/17 1300           Strength Left    # Reps 1  -EN      Left Rung 2  -EN      Left  Test 1 22  -EN       Strength Average Left 22  -EN        User Key  (r) = Recorded By, (t) = Taken By, (c) = Cosigned By    Initials Name Provider Type    KARAN De La Paz Occupational Therapist                    Therapy Education       11/06/17 1352          Therapy Education    Given Edema management;Pain management;Symptoms/condition management  -EN      Program Reinforced  -EN      How Provided Verbal  -EN      Provided to Patient  -EN      Level of Understanding Teach back education performed;Verbalized  -EN        User Key  (r) = Recorded By, (t) = Taken By, (c) = Cosigned By    Initials Name Provider Type    KARAN De La Paz Occupational Therapist                OT Assessment/Plan       11/06/17 1351       OT Assessment    Impairments Edema;Muscle strength;Range of motion;Pain  -EN     Assessment Comments Pain down significantly.  Improvement in wrist ROM and  strength.  Patient doing well with HEP.    -EN     OT Plan    OT Plan Comments Continue with goals.  -EN       User Key  (r) = Recorded By, (t) = Taken By, (c) = Cosigned By    Initials Name Provider Type    KARAN De La Paz Occupational Therapist                 OT Goals       11/06/17 1300       OT Short Term Goals    STG Date to Achieve 11/17/17  -EN     STG 1 Patient to improve left active wrist extension by 10 degrees for improved independence with ADL/IADL tasks.  -EN     STG 1 Progress Met  -EN     STG 2 Patient to improve left active wrist flexion by 10 degrees for improved ADL/IADL independence.  -EN     STG 2 Progress Met  -EN     STG 3 Patient to report decreased wrist pain at rest by 50%.  -EN     STG 3 Progress Progressing   "-EN     STG 3 Progress Comments reports pain down to 4/10  -EN     STG 4 Patient to improve left  strength by 5 # for improved grasp of utensils.  -EN     STG 4 Progress Met  -EN     STG 5 Patient to demonstrate independence with HEP including edema management, don/doffing widget brace, compensatory strategies for decreased pain, etc...  -EN     STG 5 Progress Met  -EN     Long Term Goals    LTG Date to Achieve 12/01/17  -EN     LTG 1 Patient to demonstrate WFL left wrist AROM.  -EN     LTG 1 Progress Progressing  -EN     LTG 2 Patient to report pain with activity decreased by 75%.  -EN     LTG 2 Progress Progressing  -EN     LTG 3 Patient to demonstate independence with home strengthening program.  -EN       User Key  (r) = Recorded By, (t) = Taken By, (c) = Cosigned By    Initials Name Provider Type    ROBBY Keen OTR Occupational Therapist                Modalities       11/06/17 1300          Moist Heat    MH Applied Yes  -EN      Location left hand/wrist  -EN      Rx Minutes 15 mins  -EN      MH Prior to Rx Yes  -EN      Ultrasound [id number]    Location ulnar wrist   -EN      Rx Minutes 8  -EN      Duty Cycle 50  -EN      Frequency 3.0 MHz  -EN      Intensity - Wts/cm 1  -EN        User Key  (r) = Recorded By, (t) = Taken By, (c) = Cosigned By    Initials Name Provider Type    ROBBY Keen OTR Occupational Therapist                OT Exercises       11/06/17 1300          Subjective Pain    Able to rate subjective pain? yes  -EN      Pre-Treatment Pain Level 4  -EN      Subjective Pain Comment Patient reports significant improvement. \"I've been doing my exercises!\"  -EN      Exercise 1    Exercise Name 1 retrograde edema massage  -EN      Exercise 2    Exercise Name 2 AROM exercises- wrist flexion/extension, ulnar deviation/radial deviation  -EN      Cueing 2 Verbal;Demo;Tactile  -EN      Exercise 3    Exercise Name 3 forearm supination/pronation  -EN      Cueing 3 Verbal;Tactile  -EN   "      User Key  (r) = Recorded By, (t) = Taken By, (c) = Cosigned By    Initials Name Provider Type    EN KARAN Bolivar Occupational Therapist                      Time Calculation:   OT Start Time: 1300  OT Stop Time: 1345  OT Time Calculation (min): 45 min     Therapy Charges for Today     Code Description Service Date Service Provider Modifiers Qty    50030023107 HC OT HOT OR COLD PACK TREAT MCARE 11/6/2017 KARAN Bolivar GO 1    17758780703 HC OT THERAPEUTIC ACT EA 15 MIN 11/6/2017 KARAN Bolivar GO 1    [id number] HC OT ULTRASOUND EA 15 MIN 11/6/2017 KARAN Bolivar GO 1                    KARAN Hayes  11/6/2017

## 2017-11-10 ENCOUNTER — HOSPITAL ENCOUNTER (OUTPATIENT)
Dept: OCCUPATIONAL THERAPY | Facility: HOSPITAL | Age: 62
Setting detail: THERAPIES SERIES
Discharge: HOME | End: 2017-11-10

## 2017-11-10 DIAGNOSIS — S69.82XD INJURY OF TRIANGULAR FIBROCARTILAGE COMPLEX (TFCC) OF LEFT WRIST, SUBSEQUENT ENCOUNTER: Primary | ICD-10-CM

## 2017-11-10 PROCEDURE — 97035 APP MDLTY 1+ULTRASOUND EA 15: CPT

## 2017-11-10 PROCEDURE — 97530 THERAPEUTIC ACTIVITIES: CPT

## 2017-11-10 NOTE — THERAPY TREATMENT NOTE
Outpatient Occupational Therapy Ortho Treatment Note  [facility]     Patient Name: Vero Pagan  : 1955  MRN: [id number]  Today's Date: 11/10/2017        Visit Date: 11/10/2017    Patient Active Problem List   Diagnosis   • Right upper quadrant pain   • Anterior chest wall pain   • Benign essential hypertension   • Chest pain   • Controlled type 2 diabetes mellitus without complication   • Dizziness   • Otalgia   • Fibrocystic breast changes   • Hyperlipidemia   • Low back pain   • Mass of breast   • Menopausal symptom   • Thumb pain   • Increased frequency of urination   • Vertigo   • Left ear pain   • TFCC (triangular fibrocartilage complex) injury        Past Medical History:   Diagnosis Date   • Abdominal pain, RUQ    • Acute sinusitis    • Allergic    • Anterior chest wall pain    • Benign essential hypertension    • Chest pain    • Dizziness    • Fibrocystic disease of breast    • Fracture of wrist    • History of bone density study     osteopenia in left hip   • History of fibrocystic disease of breast    • History of hypertension    • History of mammogram    • History of menopause    • History of type 2 diabetes mellitus    • History of vitamin D deficiency    • Lower back pain    • Lumbosacral disc disease    • Lump or mass in breast    • Menopausal symptoms    • Plantar fasciitis of left foot    • Thumb pain    • Type 2 diabetes mellitus    • Urinary frequency    • Vertigo    • Vitamin D deficiency disease         Past Surgical History:   Procedure Laterality Date   • BACK SURGERY     • COLONOSCOPY      normal with polyps   • HYSTERECTOMY     • PAP SMEAR      normal   • TONSILLECTOMY      x2         Visit Dx:    ICD-10-CM ICD-9-CM   1. Injury of triangular fibrocartilage complex (TFCC) of left wrist, subsequent encounter S69.82XD V58.89             OT Ortho       11/10/17 1600 11/10/17 1500       Subjective Pain    Pre-Treatment Pain Level  4   4-5  -EN     Subjective Pain Comment  hit wrist on  "bed, bed frame hit between thumb and second findger.   -EN     Left Wrist    Flexion AROM Deficit  46   significant improvement with flexion  -EN     Extension AROM Deficit  45  -EN     Ulnar Deviation PROM Deficit  13  -EN     LUE Edema - Circumference (cm)    Wrist Crease --   decreased edema at ulnar styloid  -EN        User Key  (r) = Recorded By, (t) = Taken By, (c) = Cosigned By    Initials Name Provider Type    KARAN De La Paz Occupational Therapist             Hand Therapy (last 24 hours)      Hand Eval       11/10/17 1500          Subjective Comments    Subjective Comments Patient reports she hit her hand on the bed and \"tweeked\" her wrist.  Patient reports slight pain on thumb side with ulnar deviation.  -EN       Strength Left    # Reps 1  -EN      Left Rung 2  -EN      Left  Test 1 17   slight decrease due to increased pain  -EN       Strength Average Left 17  -EN        User Key  (r) = Recorded By, (t) = Taken By, (c) = Cosigned By    Initials Name Provider Type    KARAN De La Paz Occupational Therapist                    Therapy Education       11/10/17 1609          Therapy Education    Given Symptoms/condition management;Edema management  -EN      Program New  -EN      How Provided Verbal;Demonstration  -EN      Provided to Patient  -EN      Level of Understanding Teach back education performed;Verbalized;Demonstrated  -EN        User Key  (r) = Recorded By, (t) = Taken By, (c) = Cosigned By    Initials Name Provider Type    KARAN De La Paz Occupational Therapist                OT Assessment/Plan       11/10/17 1605       OT Assessment    Impairments Edema;Pain;Muscle strength;Range of motion  -EN     Assessment Comments Patient with increased pain due to bumping hand/wrist on head board.  Despite increased pain, patient with improved wrist flexion.  Kinesio tape placed at wrist (above and below ulnar styloid for support).    -EN     OT Plan    OT Plan " Comments Continue with goals.  -EN       User Key  (r) = Recorded By, (t) = Taken By, (c) = Cosigned By    Initials Name Provider Type    ROBBY Keen OTR Occupational Therapist                 OT Goals       11/10/17 1500       OT Short Term Goals    STG Date to Achieve 11/17/17  -EN     STG 1 Patient to improve left active wrist extension by 10 degrees for improved independence with ADL/IADL tasks.  -EN     STG 1 Progress Met  -EN     STG 2 Patient to improve left active wrist flexion by 10 degrees for improved ADL/IADL independence.  -EN     STG 2 Progress Met  -EN     STG 3 Patient to report decreased wrist pain at rest by 50%.  -EN     STG 3 Progress Progressing  -EN     STG 4 Patient to improve left  strength by 5 # for improved grasp of utensils.  -EN     STG 4 Progress Met  -EN     STG 5 Patient to demonstrate independence with HEP including edema management, don/doffing widget brace, compensatory strategies for decreased pain, etc...  -EN     STG 5 Progress Met  -EN     Long Term Goals    LTG Date to Achieve 12/01/17  -EN     LTG 1 Patient to demonstrate WFL left wrist AROM.  -EN     LTG 1 Progress Progressing  -EN     LTG 2 Patient to report pain with activity decreased by 75%.  -EN     LTG 2 Progress Progressing  -EN     LTG 3 Patient to demonstate independence with home strengthening program.  -EN       User Key  (r) = Recorded By, (t) = Taken By, (c) = Cosigned By    Initials Name Provider Type    ROBBY Keen OTR Occupational Therapist                Modalities       11/10/17 1500          Moist Heat    MH Applied Yes  -EN      Location left hand/wrist  -EN      Rx Minutes 15 mins  -EN      MH Prior to Rx Yes  -EN      Ultrasound 11603    Location ulnar wrist   -EN      Rx Minutes 8  -EN      Duty Cycle 50  -EN      Frequency 3.0 MHz  -EN      Intensity - Wts/cm 1  -EN        User Key  (r) = Recorded By, (t) = Taken By, (c) = Cosigned By    Initials Name Provider Type    EN  KARAN Bolivar Occupational Therapist                OT Exercises       11/10/17 1500          Subjective Pain    Able to rate subjective pain? yes  -EN      Exercise 1    Exercise Name 1 retrograde edema massage  -EN      Exercise 2    Exercise Name 2 AROM exercises- wrist flexion/extension, ulnar deviation/radial deviation  -EN      Cueing 2 Verbal;Demo;Tactile  -EN      Exercise 3    Exercise Name 3 forearm supination/pronation  -EN      Cueing 3 Verbal;Tactile  -EN      Exercise 4    Exercise Name 4 Kinesio tape place distal and proximally to ulnar styloid for support.  -EN        User Key  (r) = Recorded By, (t) = Taken By, (c) = Cosigned By    Initials Name Provider Type    KARAN De La Paz Occupational Therapist                      Time Calculation:   OT Start Time: 1500  OT Stop Time: 1548  OT Time Calculation (min): 48 min     Therapy Charges for Today     Code Description Service Date Service Provider Modifiers Qty    07883843609 HC OT HOT OR COLD PACK TREAT MCARE 11/10/2017 KARAN Bolivar GO 1    [id number]  OT THERAPEUTIC ACT EA 15 MIN 11/10/2017 KARAN Bolivar GO 1    [id number]  OT ULTRASOUND EA 15 MIN 11/10/2017 KARAN Bolivar GO 1                    KARAN Hayes  11/10/2017

## 2017-11-13 ENCOUNTER — HOSPITAL ENCOUNTER (OUTPATIENT)
Dept: OCCUPATIONAL THERAPY | Facility: HOSPITAL | Age: 62
Setting detail: THERAPIES SERIES
Discharge: HOME | End: 2017-11-13

## 2017-11-13 DIAGNOSIS — S69.82XD INJURY OF TRIANGULAR FIBROCARTILAGE COMPLEX (TFCC) OF LEFT WRIST, SUBSEQUENT ENCOUNTER: Primary | ICD-10-CM

## 2017-11-13 PROCEDURE — 97035 APP MDLTY 1+ULTRASOUND EA 15: CPT

## 2017-11-13 PROCEDURE — 97530 THERAPEUTIC ACTIVITIES: CPT

## 2017-11-13 NOTE — THERAPY TREATMENT NOTE
Outpatient Occupational Therapy Ortho Treatment Note  [facility]     Patient Name: Vero Pagan  : 1955  MRN: [id number]  Today's Date: 2017        Visit Date: 2017    Patient Active Problem List   Diagnosis   • Right upper quadrant pain   • Anterior chest wall pain   • Benign essential hypertension   • Chest pain   • Controlled type 2 diabetes mellitus without complication   • Dizziness   • Otalgia   • Fibrocystic breast changes   • Hyperlipidemia   • Low back pain   • Mass of breast   • Menopausal symptom   • Thumb pain   • Increased frequency of urination   • Vertigo   • Left ear pain   • TFCC (triangular fibrocartilage complex) injury        Past Medical History:   Diagnosis Date   • Abdominal pain, RUQ    • Acute sinusitis    • Allergic    • Anterior chest wall pain    • Benign essential hypertension    • Chest pain    • Dizziness    • Fibrocystic disease of breast    • Fracture of wrist    • History of bone density study     osteopenia in left hip   • History of fibrocystic disease of breast    • History of hypertension    • History of mammogram    • History of menopause    • History of type 2 diabetes mellitus    • History of vitamin D deficiency    • Lower back pain    • Lumbosacral disc disease    • Lump or mass in breast    • Menopausal symptoms    • Plantar fasciitis of left foot    • Thumb pain    • Type 2 diabetes mellitus    • Urinary frequency    • Vertigo    • Vitamin D deficiency disease         Past Surgical History:   Procedure Laterality Date   • BACK SURGERY     • COLONOSCOPY      normal with polyps   • HYSTERECTOMY     • PAP SMEAR      normal   • TONSILLECTOMY      x2         Visit Dx:    ICD-10-CM ICD-9-CM   1. Injury of triangular fibrocartilage complex (TFCC) of left wrist, subsequent encounter S69.82XD V58.89             OT Ortho       17 1300          Subjective Pain    Able to rate subjective pain? yes  -EN      Pre-Treatment Pain Level 2  -EN      Subjective  Pain Comment Patient reports imrprovement, very little pain  -EN      Left Wrist    Flexion AROM Deficit 56  -EN      Extension AROM Deficit 50  -EN      RUE Edema - Circumference (cm)    Wrist Crease 16.5 cm  -EN      LUE Edema - Circumference (cm)    Wrist Crease 16.9 cm  -EN        User Key  (r) = Recorded By, (t) = Taken By, (c) = Cosigned By    Initials Name Provider Type    ROBBY Keen OTR Occupational Therapist             Hand Therapy (last 24 hours)      Hand Eval       11/13/17 1300           Strength Left    # Reps 1  -EN      Left Rung 2  -EN      Left  Test 1 25  -EN       Strength Average Left 25  -EN      Therapy Education    Given HEP  -EN      Program Progressed  -EN      How Provided Verbal  -EN      Provided to Patient  -EN      Level of Understanding Teach back education performed;Verbalized;Demonstrated  -EN        User Key  (r) = Recorded By, (t) = Taken By, (c) = Cosigned By    Initials Name Provider Type    ROBBY Keen OTR Occupational Therapist                        OT Assessment/Plan       11/13/17 1405       OT Assessment    Impairments Edema;Range of motion;Muscle strength;Pain  -EN     Assessment Comments Significant improvement with wrist flexion.  Increase in  strength and significant decrease in pain.  Yellow theraputty issued to add to HEP.  Patient doing well with progression of strengthening.  -EN     OT Plan    OT Plan Comments Continue with goals.  -EN       User Key  (r) = Recorded By, (t) = Taken By, (c) = Cosigned By    Initials Name Provider Type    ROBBY Keen OTR Occupational Therapist                 OT Goals       11/13/17 1300       OT Short Term Goals    STG Date to Achieve 11/17/17  -EN     STG 1 Patient to improve left active wrist extension by 10 degrees for improved independence with ADL/IADL tasks.  -EN     STG 1 Progress Met  -EN     STG 2 Patient to improve left active wrist flexion by 10 degrees for improved  ADL/IADL independence.  -EN     STG 2 Progress Met  -EN     STG 3 Patient to report decreased wrist pain at rest by 50%.  -EN     STG 3 Progress Met  -EN     STG 4 Patient to improve left  strength by 5 # for improved grasp of utensils.  -EN     STG 4 Progress Met  -EN     STG 5 Patient to demonstrate independence with HEP including edema management, don/doffing widget brace, compensatory strategies for decreased pain, etc...  -EN     STG 5 Progress Met  -EN     Long Term Goals    LTG Date to Achieve 12/01/17  -EN     LTG 1 Patient to demonstrate WFL left wrist AROM.  -EN     LTG 1 Progress Progressing  -EN     LTG 2 Patient to report pain with activity decreased by 75%.  -EN     LTG 2 Progress Progressing  -EN     LTG 3 Patient to demonstate independence with home strengthening program.  -EN     LTG 3 Progress Progressing  -EN       User Key  (r) = Recorded By, (t) = Taken By, (c) = Cosigned By    Initials Name Provider Type    KARAN De La Paz Occupational Therapist                              Time Calculation:   OT Start Time: 1300  OT Stop Time: 1350  OT Time Calculation (min): 50 min     Therapy Charges for Today     Code Description Service Date Service Provider Modifiers Qty    74028396776 HC OT HOT OR COLD PACK TREAT [facility] 11/13/2017 KARAN Bolivar GO 1    [id number] HC OT THERAPEUTIC ACT EA 15 MIN 11/13/2017 KARAN Bolivar GO 1    [id number] HC OT ULTRASOUND EA 15 MIN 11/13/2017 KARAN Bolivar GO 1                    KARAN Hayes  11/13/2017

## 2017-11-15 ENCOUNTER — HOSPITAL ENCOUNTER (OUTPATIENT)
Dept: OCCUPATIONAL THERAPY | Facility: HOSPITAL | Age: 62
Setting detail: THERAPIES SERIES
Discharge: HOME | End: 2017-11-15

## 2017-11-15 DIAGNOSIS — S69.82XD INJURY OF TRIANGULAR FIBROCARTILAGE COMPLEX (TFCC) OF LEFT WRIST, SUBSEQUENT ENCOUNTER: Primary | ICD-10-CM

## 2017-11-15 PROCEDURE — 97035 APP MDLTY 1+ULTRASOUND EA 15: CPT

## 2017-11-15 PROCEDURE — 97530 THERAPEUTIC ACTIVITIES: CPT

## 2017-11-15 NOTE — THERAPY TREATMENT NOTE
Outpatient Occupational Therapy Ortho Treatment Note  [facility]     Patient Name: Vero Pagan  : 1955  MRN: [id number]  Today's Date: 11/15/2017        Visit Date: 11/15/2017    Patient Active Problem List   Diagnosis   • Right upper quadrant pain   • Anterior chest wall pain   • Benign essential hypertension   • Chest pain   • Controlled type 2 diabetes mellitus without complication   • Dizziness   • Otalgia   • Fibrocystic breast changes   • Hyperlipidemia   • Low back pain   • Mass of breast   • Menopausal symptom   • Thumb pain   • Increased frequency of urination   • Vertigo   • Left ear pain   • TFCC (triangular fibrocartilage complex) injury        Past Medical History:   Diagnosis Date   • Abdominal pain, RUQ    • Acute sinusitis    • Allergic    • Anterior chest wall pain    • Benign essential hypertension    • Chest pain    • Dizziness    • Fibrocystic disease of breast    • Fracture of wrist    • History of bone density study     osteopenia in left hip   • History of fibrocystic disease of breast    • History of hypertension    • History of mammogram    • History of menopause    • History of type 2 diabetes mellitus    • History of vitamin D deficiency    • Lower back pain    • Lumbosacral disc disease    • Lump or mass in breast    • Menopausal symptoms    • Plantar fasciitis of left foot    • Thumb pain    • Type 2 diabetes mellitus    • Urinary frequency    • Vertigo    • Vitamin D deficiency disease         Past Surgical History:   Procedure Laterality Date   • BACK SURGERY     • COLONOSCOPY      normal with polyps   • HYSTERECTOMY     • PAP SMEAR      normal   • TONSILLECTOMY      x2         Visit Dx:    ICD-10-CM ICD-9-CM   1. Injury of triangular fibrocartilage complex (TFCC) of left wrist, subsequent encounter S69.82XD V58.89             OT Ortho       11/15/17 1300          Subjective Pain    Pre-Treatment Pain Level 2  -EN      Subjective Pain Comment Patient reports improvement.   Doing well with theraputty exercises  -EN      Left Elbow/Forearm    Forearm Supination Gross Movement (3+/5) fair plus   with pain  -EN      Forearm Pronation Gross Movement (3+/5) fair plus   with pain  -EN      Left Wrist    Wrist Flexion Gross Movement (4/5) good  -EN      Wrist Extension Gross Movement (3+/5) fair plus   with pain  -EN      LUE Edema - Circumference (cm)    Wrist Crease 16.5 cm  -EN        User Key  (r) = Recorded By, (t) = Taken By, (c) = Cosigned By    Initials Name Provider Type    KARAN De La Paz Occupational Therapist             Hand Therapy (last 24 hours)      Hand Eval       11/15/17 1300           Strength Left    # Reps 1  -EN      Left Rung 2  -EN      Left  Test 1 30  -EN       Strength Average Left 30  -EN        User Key  (r) = Recorded By, (t) = Taken By, (c) = Cosigned By    Initials Name Provider Type    KARAN De La Paz Occupational Therapist                    Therapy Education       11/15/17 1429          Therapy Education    Given Symptoms/condition management  -EN      Program Reinforced  -EN      How Provided Verbal  -EN      Provided to Patient  -EN      Level of Understanding Verbalized  -EN        User Key  (r) = Recorded By, (t) = Taken By, (c) = Cosigned By    Initials Name Provider Type    KARAN De La Paz Occupational Therapist                OT Assessment/Plan       11/15/17 1424       OT Assessment    Impairments Edema;Pain;Range of motion;Muscle strength  -EN     Assessment Comments Patient with improved griip strength.  Continues to have pain in ulnar wrist, especially with wrist extension.  Progressed  strengthening.  Decrease in wrsit edema.  -EN     OT Plan    OT Plan Comments Continue with goals.  Begin wrist strengthening if patient able to tolerate.   -EN       User Key  (r) = Recorded By, (t) = Taken By, (c) = Cosigned By    Initials Name Provider Type    KARAN De La Paz Occupational  Therapist                 OT Goals       11/15/17 1400       OT Short Term Goals    STG Date to Achieve 11/17/17  -EN     STG 1 Patient to improve left active wrist extension by 10 degrees for improved independence with ADL/IADL tasks.  -EN     STG 1 Progress Met  -EN     STG 2 Patient to improve left active wrist flexion by 10 degrees for improved ADL/IADL independence.  -EN     STG 2 Progress Met  -EN     STG 3 Patient to report decreased wrist pain at rest by 50%.  -EN     STG 3 Progress Met  -EN     STG 4 Patient to improve left  strength by 5 # for improved grasp of utensils.  -EN     STG 4 Progress Met  -EN     STG 5 Patient to demonstrate independence with HEP including edema management, don/doffing widget brace, compensatory strategies for decreased pain, etc...  -EN     STG 5 Progress Met  -EN     Long Term Goals    LTG Date to Achieve 12/01/17  -EN     LTG 1 Patient to demonstrate WFL left wrist AROM.  -EN     LTG 1 Progress Progressing  -EN     LTG 2 Patient to report pain with activity decreased by 75%.  -EN     LTG 2 Progress Progressing  -EN     LTG 3 Patient to demonstate independence with home strengthening program.  -EN     LTG 3 Progress Progressing  -EN       User Key  (r) = Recorded By, (t) = Taken By, (c) = Cosigned By    Initials Name Provider Type    KARAN De La Paz Occupational Therapist                Modalities       11/15/17 1300          Moist Heat    MH Applied Yes  -EN      Location left hand/wrist  -EN      Rx Minutes 15 mins  -EN      MH Prior to Rx Yes  -EN      Ultrasound 70159    Location ulnar wrist   -EN      Rx Minutes 8  -EN      Duty Cycle 50  -EN      Frequency 3.0 MHz  -EN      Intensity - Wts/cm 1  -EN        User Key  (r) = Recorded By, (t) = Taken By, (c) = Cosigned By    Initials Name Provider Type    KARAN De La Paz Occupational Therapist                OT Exercises       11/15/17 1300          Subjective Pain    Able to rate subjective pain?  yes  -EN      Exercise 1    Exercise Name 1 retrograde edema massage  -EN      Exercise 2    Exercise Name 2 AROM exercises- wrist flexion/extension, ulnar deviation/radial deviation  -EN      Cueing 2 Verbal;Demo;Tactile  -EN      Exercise 3    Exercise Name 3 forearm supination/pronation  -EN      Cueing 3 Verbal;Tactile  -EN      Exercise 4    Exercise Name 4 hand strengthening- putty, hand gripper  -EN      Cueing 4 Verbal  -EN      Equipment 4 Hand Gripper   theraputty  -EN      Weights/Plates 4 15  -EN      Resistance 4 Yellow  -EN      Exercise 5    Exercise Name 5 (pain with MMT and wrist ROM, will attempt wrist strengthening when pain level decreases)  -EN        User Key  (r) = Recorded By, (t) = Taken By, (c) = Cosigned By    Initials Name Provider Type    KARAN De La Paz Occupational Therapist                      Time Calculation:   OT Start Time: 1300  OT Stop Time: 1350  OT Time Calculation (min): 50 min     Therapy Charges for Today     Code Description Service Date Service Provider Modifiers Qty    06865061073 HC OT HOT OR COLD PACK TREAT MCARE 11/15/2017 KARAN Bolivar GO 1    [id number] HC OT THERAPEUTIC ACT EA 15 MIN 11/15/2017 KARAN Bolivar GO 1    [id number] HC OT ULTRASOUND EA 15 MIN 11/15/2017 KARAN Bolivar GO 1                    KARAN Hayes  11/15/2017

## 2017-11-20 ENCOUNTER — HOSPITAL ENCOUNTER (OUTPATIENT)
Dept: OCCUPATIONAL THERAPY | Facility: HOSPITAL | Age: 62
Setting detail: THERAPIES SERIES
Discharge: HOME | End: 2017-11-20

## 2017-11-20 DIAGNOSIS — S69.82XD INJURY OF TRIANGULAR FIBROCARTILAGE COMPLEX (TFCC) OF LEFT WRIST, SUBSEQUENT ENCOUNTER: Primary | ICD-10-CM

## 2017-11-20 PROCEDURE — 97530 THERAPEUTIC ACTIVITIES: CPT

## 2017-11-20 PROCEDURE — 97035 APP MDLTY 1+ULTRASOUND EA 15: CPT

## 2017-11-20 NOTE — THERAPY TREATMENT NOTE
Outpatient Occupational Therapy Ortho Treatment Note  [facility]     Patient Name: Vero Pagan  : 1955  MRN: [id number]  Today's Date: 2017        Visit Date: 2017    Patient Active Problem List   Diagnosis   • Right upper quadrant pain   • Anterior chest wall pain   • Benign essential hypertension   • Chest pain   • Controlled type 2 diabetes mellitus without complication   • Dizziness   • Otalgia   • Fibrocystic breast changes   • Hyperlipidemia   • Low back pain   • Mass of breast   • Menopausal symptom   • Thumb pain   • Increased frequency of urination   • Vertigo   • Left ear pain   • TFCC (triangular fibrocartilage complex) injury        Past Medical History:   Diagnosis Date   • Abdominal pain, RUQ    • Acute sinusitis    • Allergic    • Anterior chest wall pain    • Benign essential hypertension    • Chest pain    • Dizziness    • Fibrocystic disease of breast    • Fracture of wrist    • History of bone density study     osteopenia in left hip   • History of fibrocystic disease of breast    • History of hypertension    • History of mammogram    • History of menopause    • History of type 2 diabetes mellitus    • History of vitamin D deficiency    • Lower back pain    • Lumbosacral disc disease    • Lump or mass in breast    • Menopausal symptoms    • Plantar fasciitis of left foot    • Thumb pain    • Type 2 diabetes mellitus    • Urinary frequency    • Vertigo    • Vitamin D deficiency disease         Past Surgical History:   Procedure Laterality Date   • BACK SURGERY     • COLONOSCOPY      normal with polyps   • HYSTERECTOMY     • PAP SMEAR      normal   • TONSILLECTOMY      x2         Visit Dx:    ICD-10-CM ICD-9-CM   1. Injury of triangular fibrocartilage complex (TFCC) of left wrist, subsequent encounter S69.82XD V58.89             OT Ortho       17 1400          Subjective Pain    Able to rate subjective pain? yes  -SD      Pre-Treatment Pain Level 2  -SD      Subjective  Pain Comment Pt reports her pain level is improving.  Pt reports performing her theraputty exercises consistently at home without an increase in pain with the activity  -SD      Left Wrist    Extension AROM Deficit 52  -SD      LUE Edema - Circumference (cm)    Wrist Crease 16.8 cm   edema 17.3 cm prior to tx/edema management activity  -SD        User Key  (r) = Recorded By, (t) = Taken By, (c) = Cosigned By    Initials Name Provider Type    KARAN Rendon Occupational Therapist                        Therapy Education       11/20/17 1504          Therapy Education    Education Details education regarding rom program and edema management  -SD      Given HEP;Edema management  -SD      Program Progressed   more resistive putty (progressed to yellow/green mix) for hand strengthening  -SD      How Provided Verbal;Demonstration  -SD      Provided to Patient  -SD      Level of Understanding Teach back education performed;Verbalized;Demonstrated  -SD        User Key  (r) = Recorded By, (t) = Taken By, (c) = Cosigned By    Initials Name Provider KARAN Rosenberg Occupational Therapist                OT Assessment/Plan       11/20/17 3085       OT Assessment    Assessment Comments Pt reports decreased pain overall, yet she still has pain at end range for wrist flexion and extension.  She continues to demonstrate min edema at left wrist and decreased overall wrist rom.  Reinforced home rom program and edema management activity.    -SD     OT Diagnosis left wrist pain, joint stiffness, weakness  -SD     OT Plan    OT Frequency 2x/week  -SD     OT Plan Comments continue with plan.   -SD       User Key  (r) = Recorded By, (t) = Taken By, (c) = Cosigned By    Initials Name Provider Type    KARAN Rendon Occupational Therapist                 OT Goals       11/20/17 1400       OT Short Term Goals    STG 1 Patient to improve left active wrist extension by 10 degrees for improved independence  with ADL/IADL tasks.  -SD     STG 1 Progress Met  -SD     STG 2 Patient to improve left active wrist flexion by 10 degrees for improved ADL/IADL independence.  -SD     STG 2 Progress Met  -SD     STG 3 Patient to report decreased wrist pain at rest by 50%.  -SD     STG 3 Progress Met  -SD     STG 4 Patient to improve left  strength by 5 # for improved grasp of utensils.  -SD     STG 4 Progress Met  -SD     STG 5 Patient to demonstrate independence with HEP including edema management, don/doffing widget brace, compensatory strategies for decreased pain, etc...  -SD     STG 5 Progress Met  -SD     Long Term Goals    LTG 1 Patient to demonstrate WFL left wrist AROM.  -SD     LTG 1 Progress Ongoing  -SD     LTG 2 Patient to report pain with activity decreased by 75%.  -SD     LTG 2 Progress Progressing  -SD     LTG 3 Patient to demonstate independence with home strengthening program.  -SD     LTG 3 Progress Progressing  -SD       User Key  (r) = Recorded By, (t) = Taken By, (c) = Cosigned By    Initials Name Provider Type    NEEL Nichols, OTR Occupational Therapist                Modalities       11/20/17 1300          Subjective Comments    Subjective Comments Pt states her pain level has been improving.    -SD      Subjective Pain    Able to rate subjective pain? yes  -SD      Pre-Treatment Pain Level 2  -SD      Moist Heat    MH Applied Yes  -SD      Location left hand/wrist  -SD      Rx Minutes 15 mins  -SD      MH Prior to Rx Yes  -SD      Ultrasound 40228    Location ulnar wrist   -SD      Rx Minutes 8  -SD      Duty Cycle 50  -SD      Frequency 3.0 MHz  -SD      Intensity - Wts/cm 1  -SD        User Key  (r) = Recorded By, (t) = Taken By, (c) = Cosigned By    Initials Name Provider Type    NEEL Nichols OTR Occupational Therapist                OT Exercises       11/20/17 1400          Exercise 1    Exercise Name 1 retrograde edema massage  -SD      Exercise 2    Exercise Name 2 AROM exercises-  wrist flexion/extension, ulnar deviation/radial deviation  -SD      Cueing 2 Verbal;Tactile;Demo  -SD      Exercise 3    Exercise Name 3 forearm supination/pronation  -SD      Cueing 3 Verbal;Tactile  -SD      Exercise 4    Exercise Name 4 hand strengthening- putty, hand gripper  -SD      Cueing 4 Verbal  -SD      Equipment 4 Hand Gripper   digiflex  -SD      Weights/Plates 4 15  -SD      Resistance 4 Yellow;Red   digiflex  -SD        User Key  (r) = Recorded By, (t) = Taken By, (c) = Cosigned By    Initials Name Provider Type    SD KARAN Hendricks Occupational Therapist                      Time Calculation:   OT Start Time: 1250  OT Stop Time: 1503  OT Time Calculation (min): 133 min     Therapy Charges for Today     Code Description Service Date Service Provider Modifiers Qty    62651629239 HC OT HOT OR COLD PACK TREAT MCARE 11/20/2017 KARAN Hendricks GO 1    [id number]  OT ULTRASOUND EA 15 MIN 11/20/2017 KARAN Hendricks GO 1    [id number]  OT THERAPEUTIC ACT EA 15 MIN 11/20/2017 KARAN Hendricks GO 1                    KARAN Britton  11/20/2017

## 2017-11-22 ENCOUNTER — HOSPITAL ENCOUNTER (OUTPATIENT)
Dept: OCCUPATIONAL THERAPY | Facility: HOSPITAL | Age: 62
Setting detail: THERAPIES SERIES
Discharge: HOME | End: 2017-11-22

## 2017-11-22 DIAGNOSIS — S69.82XD INJURY OF TRIANGULAR FIBROCARTILAGE COMPLEX (TFCC) OF LEFT WRIST, SUBSEQUENT ENCOUNTER: Primary | ICD-10-CM

## 2017-11-22 PROCEDURE — 97530 THERAPEUTIC ACTIVITIES: CPT

## 2017-11-22 NOTE — THERAPY TREATMENT NOTE
Outpatient Occupational Therapy Ortho Treatment Note  [facility]     Patient Name: Vero Pagan  : 1955  MRN: [id number]  Today's Date: 2017        Visit Date: 2017    Patient Active Problem List   Diagnosis   • Right upper quadrant pain   • Anterior chest wall pain   • Benign essential hypertension   • Chest pain   • Controlled type 2 diabetes mellitus without complication   • Dizziness   • Otalgia   • Fibrocystic breast changes   • Hyperlipidemia   • Low back pain   • Mass of breast   • Menopausal symptom   • Thumb pain   • Increased frequency of urination   • Vertigo   • Left ear pain   • TFCC (triangular fibrocartilage complex) injury        Past Medical History:   Diagnosis Date   • Abdominal pain, RUQ    • Acute sinusitis    • Allergic    • Anterior chest wall pain    • Benign essential hypertension    • Chest pain    • Dizziness    • Fibrocystic disease of breast    • Fracture of wrist    • History of bone density study     osteopenia in left hip   • History of fibrocystic disease of breast    • History of hypertension    • History of mammogram    • History of menopause    • History of type 2 diabetes mellitus    • History of vitamin D deficiency    • Lower back pain    • Lumbosacral disc disease    • Lump or mass in breast    • Menopausal symptoms    • Plantar fasciitis of left foot    • Thumb pain    • Type 2 diabetes mellitus    • Urinary frequency    • Vertigo    • Vitamin D deficiency disease         Past Surgical History:   Procedure Laterality Date   • BACK SURGERY     • COLONOSCOPY      normal with polyps   • HYSTERECTOMY     • PAP SMEAR      normal   • TONSILLECTOMY      x2         Visit Dx:    ICD-10-CM ICD-9-CM   1. Injury of triangular fibrocartilage complex (TFCC) of left wrist, subsequent encounter S69.82XD V58.89             OT Ortho       17 1100          Subjective Pain    Able to rate subjective pain? yes  -SD      Pre-Treatment Pain Level 2  -SD      Subjective  "Pain Comment pt states her pain is \"minimal\" right now.  -SD      Left Wrist    Extension AROM Deficit 55  -SD        User Key  (r) = Recorded By, (t) = Taken By, (c) = Cosigned By    Initials Name Provider Type    NEEL Nichols, OTR Occupational Therapist             Hand Therapy (last 24 hours)      Hand Eval       11/22/17 1100           Strength Left    # Reps 1  -SD      Left Rung 2  -SD      Left  Test 1 34  -SD       Strength Average Left 34  -SD        User Key  (r) = Recorded By, (t) = Taken By, (c) = Cosigned By    Initials Name Provider Type    NEEL Nichols, OTR Occupational Therapist                        OT Assessment/Plan       11/22/17 1322       OT Assessment    Assessment Comments Pt with decresed edema and improved rom of her wrist today.  Pt able to progress with light strengthening activity without an increase in pain.  Pt with improved functional  strength today.  -SD     OT Diagnosis pain, weakness  -SD     OT Plan    OT Plan Comments continue with plan for rom, edema management and pain management.  progress with functional strengthening.  -SD       User Key  (r) = Recorded By, (t) = Taken By, (c) = Cosigned By    Initials Name Provider Type    NEEL Nichols, OTR Occupational Therapist                 OT Goals       11/22/17 1100       OT Short Term Goals    STG 1 Patient to improve left active wrist extension by 10 degrees for improved independence with ADL/IADL tasks.  -SD     STG 1 Progress Met  -SD     STG 2 Patient to improve left active wrist flexion by 10 degrees for improved ADL/IADL independence.  -SD     STG 2 Progress Met  -SD     STG 3 Patient to report decreased wrist pain at rest by 50%.  -SD     STG 3 Progress Met  -SD     STG 4 Patient to improve left  strength by 5 # for improved grasp of utensils.  -SD     STG 4 Progress Met  -SD     STG 5 Patient to demonstrate independence with HEP including edema management, don/doffing widget brace, " compensatory strategies for decreased pain, etc...  -SD     STG 5 Progress Met  -SD     Long Term Goals    LTG 1 Patient to demonstrate WFL left wrist AROM.  -SD     LTG 1 Progress Progressing  -SD     LTG 2 Patient to report pain with activity decreased by 75%.  -SD     LTG 2 Progress Ongoing  -SD     LTG 3 Patient to demonstate independence with home strengthening program.  -SD     LTG 3 Progress Ongoing  -SD       User Key  (r) = Recorded By, (t) = Taken By, (c) = Cosigned By    Initials Name Provider Type    KARAN Rendon Occupational Therapist                    OT Exercises       11/22/17 1100          Exercise 1    Exercise Name 1 retrograde edema massage  -SD      Exercise 2    Exercise Name 2 AROM exercises- wrist flexion/extension, ulnar deviation/radial deviation  -SD      Cueing 2 Verbal  -SD      Exercise 3    Exercise Name 3 forearm supination/pronation  -SD      Cueing 3 Verbal  -SD      Exercise 4    Exercise Name 4 hand strengthening- putty, hand gripper  -SD      Cueing 4 Verbal  -SD      Equipment 4 Hand Gripper   digiflex  -SD      Weights/Plates 4 25  -SD      Resistance 4 Yellow;Red   digiflex  -SD      Exercise 5    Exercise Name 5 one hand lifting with wrist stabilized in neutral position  -SD      Equipment 5 Dumbell  -SD      Weights/Plates 5 2  -SD      Sets 5 1  -SD      Reps 5 10  -SD        User Key  (r) = Recorded By, (t) = Taken By, (c) = Cosigned By    Initials Name Provider Type    KARAN Rendon Occupational Therapist                      Time Calculation:   OT Start Time: 1107  OT Stop Time: 1202  OT Time Calculation (min): 55 min     Therapy Charges for Today     Code Description Service Date Service Provider Modifiers Qty    [id number]  OT HOT OR COLD PACK TREAT MCARE 11/22/2017 KARAN Hendricks GO 1    [id number]  OT THERAPEUTIC ACT EA 15 MIN 11/22/2017 KARAN Hendricks GO 2                    KARAN Britton  11/22/2017

## 2017-11-27 ENCOUNTER — HOSPITAL ENCOUNTER (OUTPATIENT)
Dept: OCCUPATIONAL THERAPY | Facility: HOSPITAL | Age: 62
Setting detail: THERAPIES SERIES
Discharge: HOME | End: 2017-11-27

## 2017-11-27 DIAGNOSIS — S69.82XD INJURY OF TRIANGULAR FIBROCARTILAGE COMPLEX (TFCC) OF LEFT WRIST, SUBSEQUENT ENCOUNTER: Primary | ICD-10-CM

## 2017-11-27 PROCEDURE — 97035 APP MDLTY 1+ULTRASOUND EA 15: CPT

## 2017-11-27 PROCEDURE — 97530 THERAPEUTIC ACTIVITIES: CPT

## 2017-11-27 NOTE — THERAPY TREATMENT NOTE
Outpatient Occupational Therapy Ortho Treatment Note  [facility]     Patient Name: Vero Pagan  : 1955  MRN: [id number]  Today's Date: 2017        Visit Date: 2017    Patient Active Problem List   Diagnosis   • Right upper quadrant pain   • Anterior chest wall pain   • Benign essential hypertension   • Chest pain   • Controlled type 2 diabetes mellitus without complication   • Dizziness   • Otalgia   • Fibrocystic breast changes   • Hyperlipidemia   • Low back pain   • Mass of breast   • Menopausal symptom   • Thumb pain   • Increased frequency of urination   • Vertigo   • Left ear pain   • TFCC (triangular fibrocartilage complex) injury        Past Medical History:   Diagnosis Date   • Abdominal pain, RUQ    • Acute sinusitis    • Allergic    • Anterior chest wall pain    • Benign essential hypertension    • Chest pain    • Dizziness    • Fibrocystic disease of breast    • Fracture of wrist    • History of bone density study     osteopenia in left hip   • History of fibrocystic disease of breast    • History of hypertension    • History of mammogram    • History of menopause    • History of type 2 diabetes mellitus    • History of vitamin D deficiency    • Lower back pain    • Lumbosacral disc disease    • Lump or mass in breast    • Menopausal symptoms    • Plantar fasciitis of left foot    • Thumb pain    • Type 2 diabetes mellitus    • Urinary frequency    • Vertigo    • Vitamin D deficiency disease         Past Surgical History:   Procedure Laterality Date   • BACK SURGERY     • COLONOSCOPY      normal with polyps   • HYSTERECTOMY     • PAP SMEAR      normal   • TONSILLECTOMY      x2         Visit Dx:    ICD-10-CM ICD-9-CM   1. Injury of triangular fibrocartilage complex (TFCC) of left wrist, subsequent encounter S69.82XD V58.89             OT Ortho       17 1300          Subjective Pain    Subjective Pain Comment Patient reports she was doing very well with very little pain  however picked up her grandson and has had significant increased pain since. Reports all pain is on radial side, no significant pain on ulnar side.  -EN      Left Wrist    Flexion AROM Deficit 36   pain at radial wrist at Abductor pollicis longus (APL)  -EN      Extension AROM Deficit 35   pain at radial wrist at APL  -EN      LUE Edema - Circumference (cm)    Wrist Crease 16.5 cm  -EN        User Key  (r) = Recorded By, (t) = Taken By, (c) = Cosigned By    Initials Name Provider Type    KARAN De La Paz Occupational Therapist                        Therapy Education       11/27/17 1411          Therapy Education    Given Symptoms/condition management;Pain management  -EN      Program Reinforced  -EN      How Provided Verbal  -EN      Provided to Patient  -EN      Level of Understanding Teach back education performed;Verbalized  -EN        User Key  (r) = Recorded By, (t) = Taken By, (c) = Cosigned By    Initials Name Provider Type    KARAN De La Paz Occupational Therapist                OT Assessment/Plan       11/27/17 1405       OT Assessment    Assessment Comments Patient with pain at radial wrist (APL tendon) after lifting grandson last week.  Reports no significant pain at TFCC.  Pain 3-4/10 with ROM, no pain at rest.  Decrease in ROM and  strength (left  strength 10# with significant pain).  -EN     OT Diagnosis pain, weakness, decreased wrist ROM  -EN     OT Plan    OT Plan Comments Continue with goals.  Patient encouraged to continue with edema managment stratagies and continue with ROM exercises within pain limits.    -EN       User Key  (r) = Recorded By, (t) = Taken By, (c) = Cosigned By    Initials Name Provider Type    ROBBY Keen OTR Occupational Therapist                 OT Goals       11/27/17 1300       OT Short Term Goals    STG 1 Patient to improve left active wrist extension by 10 degrees for improved independence with ADL/IADL tasks.  -EN     STG 1  Progress Met  -EN     STG 2 Patient to improve left active wrist flexion by 10 degrees for improved ADL/IADL independence.  -EN     STG 2 Progress Met  -EN     STG 3 Patient to report decreased wrist pain at rest by 50%.  -EN     STG 3 Progress Met  -EN     STG 4 Patient to improve left  strength by 5 # for improved grasp of utensils.  -EN     STG 4 Progress Met  -EN     STG 5 Patient to demonstrate independence with HEP including edema management, don/doffing widget brace, compensatory strategies for decreased pain, etc...  -EN     STG 5 Progress Met  -EN     Long Term Goals    LTG 1 Patient to demonstrate WFL left wrist AROM.  -EN     LTG 1 Progress Progressing  -EN     LTG 2 Patient to report pain with activity decreased by 75%.  -EN     LTG 2 Progress Ongoing  -EN     LTG 3 Patient to demonstate independence with home strengthening program.  -EN     LTG 3 Progress Ongoing  -EN       User Key  (r) = Recorded By, (t) = Taken By, (c) = Cosigned By    Initials Name Provider Type    ROBBY Keen OTR Occupational Therapist                Modalities       11/27/17 1300          Subjective Pain    Able to rate subjective pain? yes  -EN      Subjective Pain Comment No pain at rest, with movement increases to 3-4/10  -EN      Moist Heat    MH Applied Yes  -EN      Location left hand/wrist  -EN      Rx Minutes 15 mins  -EN      MH Prior to Rx Yes  -EN      Ultrasound 40254    Location ulnar wrist   -EN      Rx Minutes 8  -EN      Duty Cycle 50  -EN      Frequency 3.0 MHz  -EN      Intensity - Wts/cm 1  -EN        User Key  (r) = Recorded By, (t) = Taken By, (c) = Cosigned By    Initials Name Provider Type    ROBBY Keen OTR Occupational Therapist                OT Exercises       11/27/17 1300          Subjective Pain    Able to rate subjective pain? yes  -EN      Exercise 1    Exercise Name 1 retrograde edema massage  -EN      Exercise 2    Exercise Name 2 AROM exercises- wrist  flexion/extension, ulnar deviation/radial deviation  -EN      Cueing 2 Verbal  -EN      Exercise 3    Exercise Name 3 forearm supination/pronation  -EN      Cueing 3 Verbal  -EN      Exercise 4    Exercise Name 4 hand strengthening attempted however patient with significant pain at radial wrist.    -EN                                                                           User Key  (r) = Recorded By, (t) = Taken By, (c) = Cosigned By    Initials Name Provider Type    EN Corinne Keen, OTR Occupational Therapist                      Time Calculation:   OT Start Time: 1300  OT Stop Time: 1358  OT Time Calculation (min): 58 min     Therapy Charges for Today     Code Description Service Date Service Provider Modifiers Qty    06059604401 HC OT HOT OR COLD PACK TREAT MCARE 11/27/2017 KARAN Bolivar GO 1    [id number]  OT THERAPEUTIC ACT EA 15 MIN 11/27/2017 KARAN Bolivar GO 1    [id number]  OT ULTRASOUND EA 15 MIN 11/27/2017 KARAN Bolivar GO 1                    KARAN Hayes  11/27/2017

## 2017-11-29 ENCOUNTER — HOSPITAL ENCOUNTER (OUTPATIENT)
Dept: OCCUPATIONAL THERAPY | Facility: HOSPITAL | Age: 62
Setting detail: THERAPIES SERIES
Discharge: HOME | End: 2017-11-29

## 2017-11-29 DIAGNOSIS — S69.82XD INJURY OF TRIANGULAR FIBROCARTILAGE COMPLEX (TFCC) OF LEFT WRIST, SUBSEQUENT ENCOUNTER: Primary | ICD-10-CM

## 2017-11-29 PROCEDURE — 97035 APP MDLTY 1+ULTRASOUND EA 15: CPT

## 2017-11-29 PROCEDURE — 97530 THERAPEUTIC ACTIVITIES: CPT

## 2017-11-29 NOTE — THERAPY TREATMENT NOTE
Outpatient Occupational Therapy Ortho Treatment Note  [facility]     Patient Name: Vero Pagan  : 1955  MRN: [id number]  Today's Date: 2017        Visit Date: 2017    Patient Active Problem List   Diagnosis   • Right upper quadrant pain   • Anterior chest wall pain   • Benign essential hypertension   • Chest pain   • Controlled type 2 diabetes mellitus without complication   • Dizziness   • Otalgia   • Fibrocystic breast changes   • Hyperlipidemia   • Low back pain   • Mass of breast   • Menopausal symptom   • Thumb pain   • Increased frequency of urination   • Vertigo   • Left ear pain   • TFCC (triangular fibrocartilage complex) injury        Past Medical History:   Diagnosis Date   • Abdominal pain, RUQ    • Acute sinusitis    • Allergic    • Anterior chest wall pain    • Benign essential hypertension    • Chest pain    • Dizziness    • Fibrocystic disease of breast    • Fracture of wrist    • History of bone density study     osteopenia in left hip   • History of fibrocystic disease of breast    • History of hypertension    • History of mammogram    • History of menopause    • History of type 2 diabetes mellitus    • History of vitamin D deficiency    • Lower back pain    • Lumbosacral disc disease    • Lump or mass in breast    • Menopausal symptoms    • Plantar fasciitis of left foot    • Thumb pain    • Type 2 diabetes mellitus    • Urinary frequency    • Vertigo    • Vitamin D deficiency disease         Past Surgical History:   Procedure Laterality Date   • BACK SURGERY     • COLONOSCOPY      normal with polyps   • HYSTERECTOMY     • PAP SMEAR      normal   • TONSILLECTOMY      x2         Visit Dx:    ICD-10-CM ICD-9-CM   1. Injury of triangular fibrocartilage complex (TFCC) of left wrist, subsequent encounter S69.82XD V58.89             OT Ortho       17 1400          Subjective Pain    Able to rate subjective pain? yes  -EN      Pre-Treatment Pain Level 2  -EN      Left Wrist     Flexion AROM Deficit 47  -EN      Extension AROM Deficit 40  -EN      LUE Edema - Circumference (cm)    Wrist Crease 16.3 cm  -EN        User Key  (r) = Recorded By, (t) = Taken By, (c) = Cosigned By    Initials Name Provider Type    KARAN De La Paz Occupational Therapist             Hand Therapy (last 24 hours)      Hand Eval       11/29/17 1400           Strength Left    # Reps 1  -EN      Left Rung 2  -EN      Left  Test 1 21  -EN       Strength Average Left 21  -EN        User Key  (r) = Recorded By, (t) = Taken By, (c) = Cosigned By    Initials Name Provider Type    KARAN De La Paz Occupational Therapist                    Therapy Education       11/29/17 1504          Therapy Education    Given Symptoms/condition management;HEP  -EN      Program Reinforced   progressed strengthening  -EN      How Provided Verbal;Demonstration  -EN      Provided to Patient  -EN      Level of Understanding Teach back education performed;Verbalized;Demonstrated  -EN        User Key  (r) = Recorded By, (t) = Taken By, (c) = Cosigned By    Initials Name Provider Type    KARAN De La Paz Occupational Therapist                OT Assessment/Plan       11/29/17 1504       OT Assessment    Assessment Comments Decreaese in pain since last appointment with improved  strength and wrist ROM.  Progressed strengthening program (yellow therapband for elbow strengthening).  Pain 2/10 at rest, pain continues to be worse on radial side of wrist, ulnar side with significant improvement.  -EN       User Key  (r) = Recorded By, (t) = Taken By, (c) = Cosigned By    Initials Name Provider Type    KARAN De La Paz Occupational Therapist                 OT Goals       11/29/17 1500       OT Short Term Goals    STG 1 Patient to improve left active wrist extension by 10 degrees for improved independence with ADL/IADL tasks.  -EN     STG 1 Progress Met  -EN     STG 2 Patient to improve left  "active wrist flexion by 10 degrees for improved ADL/IADL independence.  -EN     STG 2 Progress Met  -EN     STG 3 Patient to report decreased wrist pain at rest by 50%.  -EN     STG 3 Progress Met  -EN     STG 4 Patient to improve left  strength by 5 # for improved grasp of utensils.  -EN     STG 4 Progress Met  -EN     STG 5 Patient to demonstrate independence with HEP including edema management, don/doffing widget brace, compensatory strategies for decreased pain, etc...  -EN     STG 5 Progress Met  -EN     Long Term Goals    LTG 1 Patient to demonstrate WFL left wrist AROM.  -EN     LTG 1 Progress Progressing  -EN     LTG 2 Patient to report pain with activity decreased by 75%.  -EN     LTG 2 Progress Ongoing  -EN     LTG 3 Patient to demonstate independence with home strengthening program.  -EN     LTG 3 Progress Ongoing  -EN       User Key  (r) = Recorded By, (t) = Taken By, (c) = Cosigned By    Initials Name Provider Type    ROBBY Keen OTR Occupational Therapist                Modalities       11/29/17 1300          Subjective Pain    Able to rate subjective pain? yes  -EN      Pre-Treatment Pain Level 2  -EN      Subjective Pain Comment Pain continues to be mainly radial wrist, very minimal \"pull\" on ulnar wrist  -EN      Moist Heat    Location left hand/wrist  -EN      Rx Minutes 15 mins  -EN      MH Prior to Rx Yes  -EN      Ultrasound 47157    Location ulnar wrist/radial wrist   -EN      Rx Minutes 8  (4 min ulnar, 4 min radial)-EN      Duty Cycle 50  -EN      Frequency 3.0 MHz  -EN      Intensity - Wts/cm 1  -EN        User Key  (r) = Recorded By, (t) = Taken By, (c) = Cosigned By    Initials Name Provider Type    KARAN De La Paz Occupational Therapist                OT Exercises       11/29/17 1400          Exercise 1    Exercise Name 1 retrograde edema massage  -EN      Exercise 2    Exercise Name 2 AROM exercises- wrist flexion/extension, ulnar deviation/radial deviation  " -EN      Cueing 2 Verbal  -EN      Exercise 3    Exercise Name 3 forearm supination/pronation  -EN      Cueing 3 Verbal  -EN      Exercise 4    Exercise Name 4 hand strengthening- putty, hand gripper  -EN      Cueing 4 Verbal  -EN      Equipment 4 Hand Gripper   digiflex  -EN      Weights/Plates 4 25  -EN      Resistance 4 Yellow;Red   digiflex  -EN      Exercise 5    Exercise Name 5 elbow strengthening- flexion/extension  -EN      Equipment 5 Theraband  -EN      Resistance 5 Yellow  -EN      Sets 5 1  -EN      Reps 5 15  -EN        User Key  (r) = Recorded By, (t) = Taken By, (c) = Cosigned By    Initials Name Provider Type    EN KARAN Bolivar Occupational Therapist                      Time Calculation:   OT Start Time: 1400  OT Stop Time: 1455  OT Time Calculation (min): 55 min     Therapy Charges for Today     Code Description Service Date Service Provider Modifiers Qty    03208685421 HC OT ULTRASOUND EA 15 MIN 11/29/2017 KARAN Bolivar GO 1    [id number]  OT HOT OR COLD PACK TREAT MCARE 11/29/2017 KARAN Bolivar GO 1    [id number]  OT THERAPEUTIC ACT EA 15 MIN 11/29/2017 KARAN Bolivar GO 1                    AKRAN Hayes  11/29/2017

## 2017-12-04 ENCOUNTER — HOSPITAL ENCOUNTER (OUTPATIENT)
Dept: OCCUPATIONAL THERAPY | Facility: HOSPITAL | Age: 62
Setting detail: THERAPIES SERIES
Discharge: HOME | End: 2017-12-04

## 2017-12-04 DIAGNOSIS — S69.82XD INJURY OF TRIANGULAR FIBROCARTILAGE COMPLEX (TFCC) OF LEFT WRIST, SUBSEQUENT ENCOUNTER: Primary | ICD-10-CM

## 2017-12-04 PROCEDURE — 97530 THERAPEUTIC ACTIVITIES: CPT

## 2017-12-04 PROCEDURE — 97035 APP MDLTY 1+ULTRASOUND EA 15: CPT

## 2017-12-04 NOTE — THERAPY TREATMENT NOTE
Outpatient Occupational Therapy Ortho Treatment Note  [facility]     Patient Name: Vero Pagan  : 1955  MRN: [id number]  Today's Date: 2017        Visit Date: 2017    Patient Active Problem List   Diagnosis   • Right upper quadrant pain   • Anterior chest wall pain   • Benign essential hypertension   • Chest pain   • Controlled type 2 diabetes mellitus without complication   • Dizziness   • Otalgia   • Fibrocystic breast changes   • Hyperlipidemia   • Low back pain   • Mass of breast   • Menopausal symptom   • Thumb pain   • Increased frequency of urination   • Vertigo   • Left ear pain   • TFCC (triangular fibrocartilage complex) injury        Past Medical History:   Diagnosis Date   • Abdominal pain, RUQ    • Acute sinusitis    • Allergic    • Anterior chest wall pain    • Benign essential hypertension    • Chest pain    • Dizziness    • Fibrocystic disease of breast    • Fracture of wrist    • History of bone density study     osteopenia in left hip   • History of fibrocystic disease of breast    • History of hypertension    • History of mammogram    • History of menopause    • History of type 2 diabetes mellitus    • History of vitamin D deficiency    • Lower back pain    • Lumbosacral disc disease    • Lump or mass in breast    • Menopausal symptoms    • Plantar fasciitis of left foot    • Thumb pain    • Type 2 diabetes mellitus    • Urinary frequency    • Vertigo    • Vitamin D deficiency disease         Past Surgical History:   Procedure Laterality Date   • BACK SURGERY     • COLONOSCOPY      normal with polyps   • HYSTERECTOMY     • PAP SMEAR      normal   • TONSILLECTOMY      x2         Visit Dx:    ICD-10-CM ICD-9-CM   1. Injury of triangular fibrocartilage complex (TFCC) of left wrist, subsequent encounter S69.82XD V58.89             OT Ortho       17 1100          Left Wrist    Flexion AROM Deficit 50  -EN      Extension AROM Deficit 49  -EN      LUE Edema - Circumference  (cm)    Wrist Crease 16.4 cm  -EN        User Key  (r) = Recorded By, (t) = Taken By, (c) = Cosigned By    Initials Name Provider Type    KARAN De La Paz Occupational Therapist             Hand Therapy (last 24 hours)      Hand Eval       12/04/17 1100           Strength Left    # Reps 1  -EN      Left Rung 2  -EN      Left  Test 1 29  -EN       Strength Average Left 29  -EN        User Key  (r) = Recorded By, (t) = Taken By, (c) = Cosigned By    Initials Name Provider Type    KARAN De La Paz Occupational Therapist                    Therapy Education       12/04/17 1153          Therapy Education    Given HEP  -EN      Program Progressed  -EN      How Provided Verbal;Demonstration  -EN      Provided to Patient  -EN      Level of Understanding Teach back education performed;Verbalized;Demonstrated  -EN        User Key  (r) = Recorded By, (t) = Taken By, (c) = Cosigned By    Initials Name Provider Type    KARAN De La Paz Occupational Therapist                OT Assessment/Plan       12/04/17 1155       OT Assessment    Assessment Comments Decrease in pain.  Reports no pain in radial wrist, ulnar wrist pain is minimal..Progressed elbow strengthening program to 2#.  Patient reported no increase in pain with increase resistance.  Improvement with wrist AROM.  Doing well with HEP.  Will begin wrist strengthening when appropriate.  -EN     OT Diagnosis Pain, decreased wrist ROM, decreased strength  -EN     OT Plan    OT Plan Comments Continue with goals.  -EN       User Key  (r) = Recorded By, (t) = Taken By, (c) = Cosigned By    Initials Name Provider Type    KARAN De La Paz Occupational Therapist                 OT Goals       12/04/17 1100       OT Short Term Goals    STG 1 Patient to improve left active wrist extension by 10 degrees for improved independence with ADL/IADL tasks.  -EN     STG 1 Progress Met  -EN     STG 2 Patient to improve left active wrist  flexion by 10 degrees for improved ADL/IADL independence.  -EN     STG 2 Progress Met  -EN     STG 3 Patient to report decreased wrist pain at rest by 50%.  -EN     STG 3 Progress Met  -EN     STG 4 Patient to improve left  strength by 5 # for improved grasp of utensils.  -EN     STG 4 Progress Met  -EN     STG 5 Patient to demonstrate independence with HEP including edema management, don/doffing widget brace, compensatory strategies for decreased pain, etc...  -EN     STG 5 Progress Met  -EN     Long Term Goals    LTG Date to Achieve 12/18/17  -EN     LTG 1 Patient to demonstrate WFL left wrist AROM.  -EN     LTG 1 Progress Progressing  -EN     LTG 2 Patient to report pain with activity decreased by 75%.  -EN     LTG 2 Progress Progressing  -EN     LTG 3 Patient to demonstate independence with home strengthening program.  -EN     LTG 3 Progress Met  -EN     LTG 3 Progress Comments demonstrated independence with theraband exercise program  -EN       User Key  (r) = Recorded By, (t) = Taken By, (c) = Cosigned By    Initials Name Provider Type    ROBBY Keen OTR Occupational Therapist                Modalities       12/04/17 0900          Subjective Pain    Able to rate subjective pain? yes  -EN      Pre-Treatment Pain Level 1  -EN        User Key  (r) = Recorded By, (t) = Taken By, (c) = Cosigned By    Initials Name Provider Type    ROBBY Keen OTR Occupational Therapist                OT Exercises       12/04/17 1100          Exercise 1    Exercise Name 1 retrograde edema massage  -EN      Exercise 2    Exercise Name 2 AROM exercises- wrist flexion/extension, ulnar deviation/radial deviation  -EN      Cueing 2 Verbal  -EN      Exercise 3    Exercise Name 3 forearm supination/pronation  -EN      Cueing 3 Verbal  -EN      Exercise 4    Exercise Name 4 hand strengthening- putty, hand gripper  -EN      Cueing 4 Verbal  -EN      Equipment 4 Hand Gripper   digiflex  -EN      Weights/Plates 4 25   -EN      Resistance 4 Red   digiflex  -EN      Exercise 5    Exercise Name 5 elbow strengthening- flexion/extension  -EN      Equipment 5 Theraband  -EN      Resistance 5 Yellow  -EN      Sets 5 1  -EN      Reps 5 15  -EN      Exercise 6    Exercise Name 6 elbow strengthening- flexion/extension  -EN      Cueing 6 Verbal  -EN      Equipment 6 Dumbell  -EN      Weights/Plates 6 2  -EN      Sets 6 1  -EN      Reps 6 15  -EN        User Key  (r) = Recorded By, (t) = Taken By, (c) = Cosigned By    Initials Name Provider Type    EN KARAN Bolivar Occupational Therapist                      Time Calculation:         Therapy Charges for Today     Code Description Service Date Service Provider Modifiers Qty    64770463007 HC OT HOT OR COLD PACK TREAT MCARE 12/4/2017 KARAN Bolivar GO 1    83826600787 HC OT THERAPEUTIC ACT EA 15 MIN 12/4/2017 KARAN Bolivar GO 1    [id number] HC OT ULTRASOUND EA 15 MIN 12/4/2017 KARAN Bolivar GO 1                    KARAN Hayes  12/4/2017

## 2017-12-06 ENCOUNTER — HOSPITAL ENCOUNTER (OUTPATIENT)
Dept: OCCUPATIONAL THERAPY | Facility: HOSPITAL | Age: 62
Setting detail: THERAPIES SERIES
Discharge: HOME | End: 2017-12-06

## 2017-12-06 DIAGNOSIS — S69.82XD INJURY OF TRIANGULAR FIBROCARTILAGE COMPLEX (TFCC) OF LEFT WRIST, SUBSEQUENT ENCOUNTER: Primary | ICD-10-CM

## 2017-12-06 PROCEDURE — 97035 APP MDLTY 1+ULTRASOUND EA 15: CPT

## 2017-12-06 PROCEDURE — 97530 THERAPEUTIC ACTIVITIES: CPT

## 2017-12-06 NOTE — THERAPY TREATMENT NOTE
Outpatient Occupational Therapy Ortho Treatment Note  [facility]     Patient Name: Vero Pagan  : 1955  MRN: [id number]  Today's Date: 2017        Visit Date: 2017    Patient Active Problem List   Diagnosis   • Right upper quadrant pain   • Anterior chest wall pain   • Benign essential hypertension   • Chest pain   • Controlled type 2 diabetes mellitus without complication   • Dizziness   • Otalgia   • Fibrocystic breast changes   • Hyperlipidemia   • Low back pain   • Mass of breast   • Menopausal symptom   • Thumb pain   • Increased frequency of urination   • Vertigo   • Left ear pain   • TFCC (triangular fibrocartilage complex) injury        Past Medical History:   Diagnosis Date   • Abdominal pain, RUQ    • Acute sinusitis    • Allergic    • Anterior chest wall pain    • Benign essential hypertension    • Chest pain    • Dizziness    • Fibrocystic disease of breast    • Fracture of wrist    • History of bone density study     osteopenia in left hip   • History of fibrocystic disease of breast    • History of hypertension    • History of mammogram    • History of menopause    • History of type 2 diabetes mellitus    • History of vitamin D deficiency    • Lower back pain    • Lumbosacral disc disease    • Lump or mass in breast    • Menopausal symptoms    • Plantar fasciitis of left foot    • Thumb pain    • Type 2 diabetes mellitus    • Urinary frequency    • Vertigo    • Vitamin D deficiency disease         Past Surgical History:   Procedure Laterality Date   • BACK SURGERY     • COLONOSCOPY      normal with polyps   • HYSTERECTOMY     • PAP SMEAR      normal   • TONSILLECTOMY      x2         Visit Dx:    ICD-10-CM ICD-9-CM   1. Injury of triangular fibrocartilage complex (TFCC) of left wrist, subsequent encounter S69.82XD V58.89             OT Ortho       17 1000          Left Wrist    Flexion AROM Deficit 55  -EN      Extension AROM Deficit 50  -EN      LUE Edema - Circumference  (cm)    Wrist Crease 16.2 cm  -EN        User Key  (r) = Recorded By, (t) = Taken By, (c) = Cosigned By    Initials Name Provider Type    KARAN De La Paz Occupational Therapist             Hand Therapy (last 24 hours)      Hand Eval       12/06/17 1000           Strength Left    # Reps 1  -EN      Left Rung 2  -EN      Left  Test 1 31  -EN       Strength Average Left 31  -EN        User Key  (r) = Recorded By, (t) = Taken By, (c) = Cosigned By    Initials Name Provider Type    KARAN De La Paz Occupational Therapist                    Therapy Education       12/06/17 1155          Therapy Education    Given HEP  -EN      Program Progressed  -EN      How Provided Verbal;Demonstration  -EN      Provided to Patient  -EN      Level of Understanding Teach back education performed;Verbalized;Demonstrated  -EN        User Key  (r) = Recorded By, (t) = Taken By, (c) = Cosigned By    Initials Name Provider Type    KARAN De La Paz Occupational Therapist                OT Assessment/Plan       12/06/17 1153       OT Assessment    Assessment Comments Paitent doing very well with HEP.  Pain 0/10 at rest.  Patient performed light wrist strengthening without pain (reports a slight pull).  Improved  strength and wrist ROM. Added light wrist strengthening to HEP.  -EN     OT Plan    OT Plan Comments Decrease to 1 X per week due to progress.  -EN       User Key  (r) = Recorded By, (t) = Taken By, (c) = Cosigned By    Initials Name Provider Type    ROBBY Keen OTKAT Occupational Therapist                 OT Goals       12/06/17 1000       OT Short Term Goals    STG 1 Patient to improve left active wrist extension by 10 degrees for improved independence with ADL/IADL tasks.  -EN     STG 1 Progress Met  -EN     STG 2 Patient to improve left active wrist flexion by 10 degrees for improved ADL/IADL independence.  -EN     STG 2 Progress Met  -EN     STG 3 Patient to report  decreased wrist pain at rest by 50%.  -EN     STG 3 Progress Met  -EN     STG 4 Patient to improve left  strength by 5 # for improved grasp of utensils.  -EN     STG 4 Progress Met  -EN     STG 5 Patient to demonstrate independence with HEP including edema management, don/doffing widget brace, compensatory strategies for decreased pain, etc...  -EN     STG 5 Progress Met  -EN     Long Term Goals    LTG Date to Achieve 12/18/17  -EN     LTG 1 Patient to demonstrate WFL left wrist AROM.  -EN     LTG 1 Progress Progressing  -EN     LTG 2 Patient to report pain with activity decreased by 75%.  -EN     LTG 2 Progress Progressing  -EN     LTG 3 Patient to demonstate independence with home strengthening program.  -EN     LTG 3 Progress Met  -EN       User Key  (r) = Recorded By, (t) = Taken By, (c) = Cosigned By    Initials Name Provider Type    EN Corinne Keen, OTR Occupational Therapist                Modalities       12/06/17 0900          Subjective Pain    Able to rate subjective pain? yes  -EN      Pre-Treatment Pain Level 0  -EN      Moist Heat    MH Applied Yes  -EN      Location left hand/wrist  -EN      Rx Minutes 15 mins  -EN      Ultrasound 16711    Location ulnar wrist   -EN      Rx Minutes 8  -EN      Duty Cycle 50  -EN      Frequency 3.0 MHz  -EN      Intensity - Wts/cm 1  -EN        User Key  (r) = Recorded By, (t) = Taken By, (c) = Cosigned By    Initials Name Provider Type    EN Corinne Keen, OTR Occupational Therapist                OT Exercises       12/06/17 1000          Subjective Pain    Able to rate subjective pain? yes  -EN      Pre-Treatment Pain Level 0  -EN      Exercise 1    Exercise Name 1 retrograde edema massage  -EN      Exercise 2    Exercise Name 2 AROM exercises- wrist flexion/extension, ulnar deviation/radial deviation  -EN      Cueing 2 Verbal  -EN      Exercise 3    Exercise Name 3 forearm supination/pronation  -EN      Cueing 3 Verbal  -EN      Exercise 4     Exercise Name 4 hand strengthening- putty, hand gripper  -EN      Cueing 4 Verbal  -EN      Equipment 4 Hand Gripper   digiflex, hand gripper 30#  -EN      Weights/Plates 4 --   30  -EN      Resistance 4 Red   digiflex  -EN      Sets 4 1  -EN      Reps 4 20  -EN      Exercise 5    Exercise Name 5 wrist strengthening  -EN      Cueing 5 Tactile;Verbal  -EN      Equipment 5 Dumbell  -EN      Weights/Plates 5 1  -EN      Sets 5 1  -EN      Reps 5 10  -EN      Exercise 6    Exercise Name 6 elbow strengthening- flexion/extension  -EN      Cueing 6 Verbal  -EN      Equipment 6 Dumbell  -EN      Weights/Plates 6 2  -EN      Sets 6 1  -EN      Reps 6 15  -EN        User Key  (r) = Recorded By, (t) = Taken By, (c) = Cosigned By    Initials Name Provider Type    KARAN De La Paz Occupational Therapist                      Time Calculation:   OT Start Time: 1000  OT Stop Time: 1100  OT Time Calculation (min): 60 min     Therapy Charges for Today     Code Description Service Date Service Provider Modifiers Qty    08321644042 HC OT HOT OR COLD PACK TREAT MCARE 12/6/2017 KARAN Bolivar GO 1    [id number] HC OT THERAPEUTIC ACT EA 15 MIN 12/6/2017 KARAN Bolivar GO 1    [id number] HC OT ULTRASOUND EA 15 MIN 12/6/2017 KARAN Bolivar GO 1                    KARAN Hayes  12/6/2017

## 2017-12-13 ENCOUNTER — HOSPITAL ENCOUNTER (OUTPATIENT)
Dept: OCCUPATIONAL THERAPY | Facility: HOSPITAL | Age: 62
Setting detail: THERAPIES SERIES
Discharge: HOME | End: 2017-12-13

## 2017-12-13 DIAGNOSIS — S69.82XD INJURY OF TRIANGULAR FIBROCARTILAGE COMPLEX (TFCC) OF LEFT WRIST, SUBSEQUENT ENCOUNTER: Primary | ICD-10-CM

## 2017-12-13 PROCEDURE — 97530 THERAPEUTIC ACTIVITIES: CPT

## 2017-12-13 NOTE — THERAPY TREATMENT NOTE
Outpatient Occupational Therapy Ortho Treatment Note  [facility]     Patient Name: Vero Pagan  : 1955  MRN: [id number]  Today's Date: 2017        Visit Date: 2017    Patient Active Problem List   Diagnosis   • Right upper quadrant pain   • Anterior chest wall pain   • Benign essential hypertension   • Chest pain   • Controlled type 2 diabetes mellitus without complication   • Dizziness   • Otalgia   • Fibrocystic breast changes   • Hyperlipidemia   • Low back pain   • Mass of breast   • Menopausal symptom   • Thumb pain   • Increased frequency of urination   • Vertigo   • Left ear pain   • TFCC (triangular fibrocartilage complex) injury        Past Medical History:   Diagnosis Date   • Abdominal pain, RUQ    • Acute sinusitis    • Allergic    • Anterior chest wall pain    • Benign essential hypertension    • Chest pain    • Dizziness    • Fibrocystic disease of breast    • Fracture of wrist    • History of bone density study     osteopenia in left hip   • History of fibrocystic disease of breast    • History of hypertension    • History of mammogram    • History of menopause    • History of type 2 diabetes mellitus    • History of vitamin D deficiency    • Lower back pain    • Lumbosacral disc disease    • Lump or mass in breast    • Menopausal symptoms    • Plantar fasciitis of left foot    • Thumb pain    • Type 2 diabetes mellitus    • Urinary frequency    • Vertigo    • Vitamin D deficiency disease         Past Surgical History:   Procedure Laterality Date   • BACK SURGERY     • COLONOSCOPY      normal with polyps   • HYSTERECTOMY     • PAP SMEAR      normal   • TONSILLECTOMY      x2         Visit Dx:    ICD-10-CM ICD-9-CM   1. Injury of triangular fibrocartilage complex (TFCC) of left wrist, subsequent encounter S69.82XD V58.89             OT Ortho       17 1000          Left Wrist    Flexion AROM Deficit 55  -EN      Extension AROM Deficit 52  -EN      Left Elbow/Forearm     Forearm Supination Gross Movement (4/5) good  -EN      Forearm Pronation Gross Movement (4/5) good  -EN      Left Wrist    Wrist Flexion Gross Movement (4/5) good  -EN      Wrist Extension Gross Movement (4/5) good  -EN        User Key  (r) = Recorded By, (t) = Taken By, (c) = Cosigned By    Initials Name Provider Type    KARAN De La Paz Occupational Therapist             Hand Therapy (last 24 hours)      Hand Eval       12/13/17 1000           Strength Left    # Reps 1  -EN      Left Rung 2  -EN      Left  Test 1 41  -EN       Strength Average Left 41  -EN        User Key  (r) = Recorded By, (t) = Taken By, (c) = Cosigned By    Initials Name Provider Type    KARAN De La Paz Occupational Therapist              Therapy Education  Given: HEP, Symptoms/condition management  Program: Progressed  How Provided: Verbal, Demonstration  Provided to: Patient  Level of Understanding: Teach back education performed, Verbalized, Demonstrated          OT Assessment/Plan       12/13/17 1129       OT Assessment    Impairments Edema;Pain;Muscle strength;Range of motion  -EN     Assessment Comments Patient reports she continues to have no pain, has been weaning self from brace.  Patient reports pain has not increased over a 1/10 the past week with activity.  Patient doing well with progression of strengthening.  Educated on progressing self with HEP weekly.   strength up significantly and wrist and forearm strength is 4/5.  Patient agreeable to continue HEP independently and call if any problems.  -EN     OT Plan    OT Plan Comments Patient to continue HEP independently and call if she has increased pain or declines.  -EN       User Key  (r) = Recorded By, (t) = Taken By, (c) = Cosigned By    Initials Name Provider Type    KARAN De La Paz Occupational Therapist                 OT Goals       12/13/17 1000       OT Short Term Goals    STG 1 Patient to improve left active wrist  extension by 10 degrees for improved independence with ADL/IADL tasks.  -EN     STG 1 Progress Met  -EN     STG 2 Patient to improve left active wrist flexion by 10 degrees for improved ADL/IADL independence.  -EN     STG 2 Progress Met  -EN     STG 3 Patient to report decreased wrist pain at rest by 50%.  -EN     STG 3 Progress Met  -EN     STG 4 Patient to improve left  strength by 5 # for improved grasp of utensils.  -EN     STG 4 Progress Met  -EN     STG 5 Patient to demonstrate independence with HEP including edema management, don/doffing widget brace, compensatory strategies for decreased pain, etc...  -EN     STG 5 Progress Met  -EN     Long Term Goals    LTG Date to Achieve 12/18/17  -EN     LTG 1 Patient to demonstrate WFL left wrist AROM.  -EN     LTG 1 Progress Met  -EN     LTG 2 Patient to report pain with activity decreased by 75%.  -EN     LTG 2 Progress Met  -EN     LTG 2 Progress Comments patient reports pain has not increased over a 1/10 with use  -EN     LTG 3 Patient to demonstate independence with home strengthening program.  -EN     LTG 3 Progress Met  -EN       User Key  (r) = Recorded By, (t) = Taken By, (c) = Cosigned By    Initials Name Provider Type    ROBBY Keen OTR Occupational Therapist                Modalities       12/13/17 0900          Subjective Pain    Able to rate subjective pain? yes  -EN      Pre-Treatment Pain Level 0  -EN      Moist Heat    Location left hand/wrist  -EN      Rx Minutes 15 mins  -EN      Ultrasound 03251    Location ulnar wrist   -EN      Rx Minutes 8  -EN      Duty Cycle 50  -EN      Frequency 3.0 MHz  -EN      Intensity - Wts/cm 1  -EN        User Key  (r) = Recorded By, (t) = Taken By, (c) = Cosigned By    Initials Name Provider Type    ROBBY Keen, OTR Occupational Therapist                  Quick DASH  Open a tight or new jar.: Moderate Difficulty  Do heavy household chores (e.g., wash walls, wash floors): Moderate  Difficulty  Carry a shopping bag or briefcase: Moderate Difficulty  Wash your back: Moderate Difficulty  Use a knife to cut food: Moderate Difficulty  During the past week, to what extent has your arm, shoulder, or hand problem interfered with your normal social activites with family, friends, neighbors or groups?: Not at all  Arm, Shoulder, or hand pain: Mild  Tingling (pins and needles) in your arm, shoulder, or hand: None  During the past week, how much difficulty have you had sleeping because of the pain in your arm, shoulder or hand?: No difficulty  Number of Questions Answered: 10  Quick DASH Score: 25           Time Calculation:   OT Start Time: 1000  OT Stop Time: 1040  OT Time Calculation (min): 40 min     Therapy Charges for Today     Code Description Service Date Service Provider Modifiers Qty    [id number]  OT HOT OR COLD PACK TREAT MCARE 12/13/2017 KARAN Bolivar GO 1    [id number]  OT THERAPEUTIC ACT EA 15 MIN 12/13/2017 KARAN Bolivar GO 1                    KARNA Hayes  12/13/2017

## 2018-01-02 ENCOUNTER — DOCUMENTATION (OUTPATIENT)
Dept: OCCUPATIONAL THERAPY | Facility: HOSPITAL | Age: 63
End: 2018-01-02

## 2018-01-02 DIAGNOSIS — S69.82XD INJURY OF TRIANGULAR FIBROCARTILAGE COMPLEX (TFCC) OF LEFT WRIST, SUBSEQUENT ENCOUNTER: Primary | ICD-10-CM

## 2018-01-02 NOTE — THERAPY DISCHARGE NOTE
Outpatient Occupational Therapy Discharge Summary         Patient Name: Vero Pagan  : 1955  MRN: [id number]    Today's Date: 2018      Visit Date: 2018            OT Goals       18 1300       OT Short Term Goals    STG 1 Patient to improve left active wrist extension by 10 degrees for improved independence with ADL/IADL tasks.  -EN     STG 1 Progress Met  -EN     STG 2 Patient to improve left active wrist flexion by 10 degrees for improved ADL/IADL independence.  -EN     STG 2 Progress Met  -EN     STG 3 Patient to report decreased wrist pain at rest by 50%.  -EN     STG 3 Progress Met  -EN     STG 4 Patient to improve left  strength by 5 # for improved grasp of utensils.  -EN     STG 4 Progress Met  -EN     STG 5 Patient to demonstrate independence with HEP including edema management, don/doffing widget brace, compensatory strategies for decreased pain, etc...  -EN     STG 5 Progress Met  -EN     Long Term Goals    LTG Date to Achieve 17  -EN     LTG 1 Patient to demonstrate WFL left wrist AROM.  -EN     LTG 1 Progress Met  -EN     LTG 2 Patient to report pain with activity decreased by 75%.  -EN     LTG 2 Progress Met  -EN     LTG 3 Patient to demonstate independence with home strengthening program.  -EN     LTG 3 Progress Met  -EN       User Key  (r) = Recorded By, (t) = Taken By, (c) = Cosigned By    Initials Name Provider Type    KARAN De La Paz Occupational Therapist           OP OT Discharge Summary  Date of Discharge: 18  Reason for Discharge: All goals achieved  Discharge Destination: Home with home program      Time Calculation:                         KARAN Hayes   2018

## 2018-01-26 ENCOUNTER — OFFICE VISIT (OUTPATIENT)
Dept: CARDIOLOGY | Facility: CLINIC | Age: 63
End: 2018-01-26

## 2018-01-26 VITALS
HEIGHT: 70 IN | HEART RATE: 75 BPM | DIASTOLIC BLOOD PRESSURE: 98 MMHG | BODY MASS INDEX: 31.35 KG/M2 | SYSTOLIC BLOOD PRESSURE: 148 MMHG | WEIGHT: 219 LBS

## 2018-01-26 DIAGNOSIS — E78.2 MIXED HYPERLIPIDEMIA: ICD-10-CM

## 2018-01-26 DIAGNOSIS — R07.2 PRECORDIAL PAIN: ICD-10-CM

## 2018-01-26 DIAGNOSIS — I10 BENIGN ESSENTIAL HYPERTENSION: Primary | ICD-10-CM

## 2018-01-26 DIAGNOSIS — E11.9 CONTROLLED TYPE 2 DIABETES MELLITUS WITHOUT COMPLICATION, WITHOUT LONG-TERM CURRENT USE OF INSULIN (HCC): ICD-10-CM

## 2018-01-26 PROCEDURE — 93000 ELECTROCARDIOGRAM COMPLETE: CPT | Performed by: INTERNAL MEDICINE

## 2018-01-26 PROCEDURE — 99214 OFFICE O/P EST MOD 30 MIN: CPT | Performed by: INTERNAL MEDICINE

## 2018-01-26 RX ORDER — METOPROLOL SUCCINATE 25 MG/1
25 TABLET, EXTENDED RELEASE ORAL DAILY
Qty: 90 TABLET | Refills: 3 | Status: SHIPPED | OUTPATIENT
Start: 2018-01-26 | End: 2019-01-01 | Stop reason: SDUPTHER

## 2018-01-26 RX ORDER — HYDROCHLOROTHIAZIDE 25 MG/1
25 TABLET ORAL DAILY
Qty: 90 TABLET | Refills: 3 | Status: SHIPPED | OUTPATIENT
Start: 2018-01-26

## 2018-01-26 RX ORDER — SIMVASTATIN 20 MG
20 TABLET ORAL NIGHTLY
Qty: 90 TABLET | Refills: 3 | Status: SHIPPED | OUTPATIENT
Start: 2018-01-26 | End: 2019-01-01 | Stop reason: SDUPTHER

## 2018-01-26 RX ORDER — METOPROLOL SUCCINATE 25 MG/1
25 TABLET, EXTENDED RELEASE ORAL DAILY
Qty: 90 TABLET | Refills: 3 | Status: SHIPPED | OUTPATIENT
Start: 2018-01-26 | End: 2018-01-26 | Stop reason: SDUPTHER

## 2018-01-26 RX ORDER — SIMVASTATIN 20 MG
20 TABLET ORAL NIGHTLY
Qty: 90 TABLET | Refills: 3 | Status: SHIPPED | OUTPATIENT
Start: 2018-01-26 | End: 2018-01-26 | Stop reason: SDUPTHER

## 2018-01-26 NOTE — PROGRESS NOTES
Date of Office Visit: 2018  Encounter Provider: Herminia Neal MD  Place of Service: [facility] CARDIOLOGY  Patient Name: Vero Pagan  :1955      Patient ID:  Vero Pagan is a 63 y.o. female is here for  followup for HTN and cardiac risks.         History of Present Illness    She has a known history of mild coronary artery disease and hypertension. She has a strong  family history of cardiovascular disease. She has had multiple stress studies in the past.  Her last stress echocardiogram 2011 at [facility] was normal showing  no ischemia.      She had a cardiac catheterization done 2009 showing mild nonobstructive coronary  artery disease with 20% left main stenosis, 30% diffuse LAD stenosis, 40% ostial OM-1  stenosis, 30 to 40% diffuse RCA stenosis, and ejection fraction of 62%.          I saw her in . At that time,  she was having chest pain, so we set her up for a stress echocardiogram, which was done on  2013. This showed no evidence of ischemia. It was a normal stress echocardiogram.  Her baseline echocardiogram showed ejection fraction of 55% with trace tricuspid and  mitral insufficiency. She had had a prior stress echocardiogram, which was normal in  .       She is known to have diabetes mellitus, hypertension, and hyperlipidemia. She has a  history of gastroesophageal reflux disease.       In 2015 she came in with chest pain which was happening at rest and with exertion.  We set her up for a stress nuclear perfusion study which was done 2015 and this showed no evidence of ischemia.    She comes in at this time not feeling well.  Her blood pressures little high.  She's been having intermittent chest heaviness going into her neck.  It is not exertional.  It does cause her to feel short winded.  At last about 5 minutes and goes away.  There is nothing that she knows of for sure relieves it.  She has been under  some stress.  She doesn't feel her heart racing or skipping.  She's had no dizziness or syncope.  She has no nausea.  She is taking her medications as directed.    She had laboratory values done March 2017, HDL 54 and LDL 98.    Past Medical History:   Diagnosis Date   • Abdominal pain, RUQ    • Acute sinusitis    • Allergic    • Anterior chest wall pain    • Benign essential hypertension    • Chest pain    • Dizziness    • Fibrocystic disease of breast    • Fracture of wrist    • History of bone density study     osteopenia in left hip   • History of fibrocystic disease of breast    • History of hypertension    • History of mammogram    • History of menopause    • History of type 2 diabetes mellitus    • History of vitamin D deficiency    • Lower back pain    • Lumbosacral disc disease    • Lump or mass in breast    • Menopausal symptoms    • Plantar fasciitis of left foot    • Thumb pain    • Type 2 diabetes mellitus    • Urinary frequency    • Vertigo    • Vitamin D deficiency disease          Past Surgical History:   Procedure Laterality Date   • BACK SURGERY     • COLONOSCOPY      normal with polyps   • HYSTERECTOMY     • PAP SMEAR      normal   • TONSILLECTOMY      x2       Current Outpatient Prescriptions on File Prior to Visit   Medication Sig Dispense Refill   • aspirin 81 MG tablet Take 1 tablet by mouth Daily.     • Cholecalciferol (VITAMIN D3) 5000 UNITS tablet Take  by mouth Daily.     • cyanocobalamin (VITAMIN B-12) 250 MCG tablet Take  by mouth.     • estradiol (CLIMARA) 0.05 MG/24HR patch Place 1 patch on the skin 1 (One) Time Per Week.     • Flaxseed, Linseed, (FLAXSEED OIL) 1000 MG capsule Take  by mouth Daily.     • folic acid (FOLVITE) 1 MG tablet Take 1 mg by mouth Daily.     • metFORMIN (GLUCOPHAGE) 500 MG tablet Take 500 mg by mouth Daily With Breakfast.     • Omega-3 Fatty Acids (FISH OIL PO) Take 1,000 mg by mouth Daily.     • [DISCONTINUED] hydrochlorothiazide (HYDRODIURIL) 25 MG tablet  TAKE 1 TABLET DAILY 90 tablet 1   • [DISCONTINUED] simvastatin (ZOCOR) 20 MG tablet TAKE 1 TABLET DAILY 90 tablet 1     No current facility-administered medications on file prior to visit.        Social History     Social History   • Marital status:      Spouse name: N/A   • Number of children: N/A   • Years of education: N/A     Occupational History   • Not on file.     Social History Main Topics   • Smoking status: Former Smoker   • Smokeless tobacco: Never Used      Comment: caffeine use   • Alcohol use Yes      Comment: being a social drinker   • Drug use: No   • Sexual activity: Defer     Other Topics Concern   • Not on file     Social History Narrative           Review of Systems   Constitution: Positive for malaise/fatigue.   HENT: Negative for congestion.    Eyes: Negative for vision loss in left eye and vision loss in right eye.   Respiratory: Positive for shortness of breath. Negative for cough, hemoptysis, sleep disturbances due to breathing, snoring, sputum production and wheezing.    Endocrine: Negative.    Hematologic/Lymphatic: Negative.    Skin: Negative for poor wound healing and rash.   Musculoskeletal: Positive for joint pain. Negative for falls, gout, muscle cramps and myalgias.   Gastrointestinal: Negative for abdominal pain, diarrhea, dysphagia, hematemesis, melena, nausea and vomiting.   Neurological: Negative for excessive daytime sleepiness, dizziness, headaches, light-headedness, loss of balance, seizures and vertigo.   Psychiatric/Behavioral: Negative for depression and substance abuse. The patient is not nervous/anxious.        Procedures    ECG 12 Lead  Date/Time: 1/26/2018 1:41 PM  Performed by: JUNIOR HERRERA  Authorized by: JUNIOR HERRERA   Comparison: compared with previous ECG   Similar to previous ECG  Rhythm: sinus rhythm  QRS axis: left  Other findings: PRWP  Clinical impression: non-specific ECG               Objective:      Vitals:    01/26/18 1327 01/26/18  "1356   BP: 160/100 148/98   BP Location: Right arm    Patient Position: Sitting    Pulse: 75    Weight: 99.3 kg (219 lb)    Height: 177.8 cm (70\")      Body mass index is 31.42 kg/(m^2).    Physical Exam   Constitutional: She is oriented to person, place, and time. She appears well-developed and well-nourished. No distress.   HENT:   Head: Normocephalic and atraumatic.   Eyes: Conjunctivae are normal. No scleral icterus.   Neck: Neck supple. No JVD present. Carotid bruit is not present. No thyromegaly present.   Cardiovascular: Normal rate, regular rhythm, S1 normal, S2 normal, normal heart sounds and intact distal pulses.   No extrasystoles are present. PMI is not displaced.  Exam reveals no gallop.    No murmur heard.  Pulses:       Carotid pulses are 2+ on the right side, and 2+ on the left side.       Radial pulses are 2+ on the right side, and 2+ on the left side.        Dorsalis pedis pulses are 2+ on the right side, and 2+ on the left side.        Posterior tibial pulses are 2+ on the right side, and 2+ on the left side.   Pulmonary/Chest: Effort normal and breath sounds normal. No respiratory distress. She has no wheezes. She has no rhonchi. She has no rales. She exhibits no tenderness.   Abdominal: Soft. Bowel sounds are normal. She exhibits no distension, no abdominal bruit and no mass. There is no tenderness.   Musculoskeletal: She exhibits no edema or deformity.   Lymphadenopathy:     She has no cervical adenopathy.   Neurological: She is alert and oriented to person, place, and time. No cranial nerve deficit.   Skin: Skin is warm and dry. No rash noted. She is not diaphoretic. No cyanosis. No pallor. Nails show no clubbing.   Psychiatric: She has a normal mood and affect. Judgment normal.   Vitals reviewed.      Lab Review:       Assessment:      Diagnosis Plan   1. Benign essential hypertension     2. Mixed hyperlipidemia     3. Controlled type 2 diabetes mellitus without complication, without " long-term current use of insulin     4. Precordial pain  Adult Transthoracic Echo Complete W/ Cont if Necessary Per Protocol    Stress Test With Myocardial Perfusion One Day        1. Normal stress nuclear study 2015.    2. Leftward axis on ECG.   3. Impaired glucose tolerance. This has improved with weight loss.   4. Strong family history of premature cardiovascular disease. Her father had a myocardial  infarction at age 63 and mother had bypass grafting. Her sister had a myocardial  infarction in her 30s and  with heart failure. Another sister had coronary artery  bypass grafting at age 61. Brother  at 56 with a myocardial infarction.   5. Hyperlipidemia on simvastatin.   6. Hypertension, start toprol xl 25mg nightly and continue hctz.  7. Chest pain, set up stress nuclear and echo.        Plan:       See back in 3 months.

## 2018-02-02 ENCOUNTER — HOSPITAL ENCOUNTER (OUTPATIENT)
Dept: NUCLEAR MEDICINE | Facility: HOSPITAL | Age: 63
Discharge: HOME | End: 2018-02-02
Attending: INTERNAL MEDICINE

## 2018-02-02 ENCOUNTER — HOSPITAL ENCOUNTER (OUTPATIENT)
Dept: CARDIOLOGY | Facility: HOSPITAL | Age: 63
Discharge: HOME | End: 2018-02-02
Attending: INTERNAL MEDICINE

## 2018-02-02 VITALS
BODY MASS INDEX: 31.35 KG/M2 | HEIGHT: 70 IN | WEIGHT: 219 LBS | SYSTOLIC BLOOD PRESSURE: 161 MMHG | DIASTOLIC BLOOD PRESSURE: 97 MMHG | RESPIRATION RATE: 18 BRPM | OXYGEN SATURATION: 99 % | HEART RATE: 70 BPM

## 2018-02-02 VITALS
SYSTOLIC BLOOD PRESSURE: 163 MMHG | DIASTOLIC BLOOD PRESSURE: 68 MMHG | BODY MASS INDEX: 31.35 KG/M2 | WEIGHT: 219 LBS | HEIGHT: 70 IN | OXYGEN SATURATION: 99 % | HEART RATE: 77 BPM

## 2018-02-02 DIAGNOSIS — R07.2 PRECORDIAL PAIN: ICD-10-CM

## 2018-02-02 LAB
BH CV ECHO MEAS - ACS: 1.6 CM
BH CV ECHO MEAS - AO MAX PG (FULL): 2.9 MMHG
BH CV ECHO MEAS - AO MAX PG: 7.5 MMHG
BH CV ECHO MEAS - AO MEAN PG (FULL): 2 MMHG
BH CV ECHO MEAS - AO MEAN PG: 4 MMHG
BH CV ECHO MEAS - AO ROOT AREA (BSA CORRECTED): 1.5
BH CV ECHO MEAS - AO ROOT AREA: 8 CM^2
BH CV ECHO MEAS - AO ROOT DIAM: 3.2 CM
BH CV ECHO MEAS - AO V2 MAX: 137 CM/SEC
BH CV ECHO MEAS - AO V2 MEAN: 86.5 CM/SEC
BH CV ECHO MEAS - AO V2 VTI: 32.5 CM
BH CV ECHO MEAS - AVA(I,A): 2.3 CM^2
BH CV ECHO MEAS - AVA(I,D): 2.3 CM^2
BH CV ECHO MEAS - AVA(V,A): 2.7 CM^2
BH CV ECHO MEAS - AVA(V,D): 2.7 CM^2
BH CV ECHO MEAS - BSA(HAYCOCK): 2.2 M^2
BH CV ECHO MEAS - BSA: 2.2 M^2
BH CV ECHO MEAS - BZI_BMI: 31.4 KILOGRAMS/M^2
BH CV ECHO MEAS - BZI_METRIC_HEIGHT: 177.8 CM
BH CV ECHO MEAS - BZI_METRIC_WEIGHT: 99.3 KG
BH CV ECHO MEAS - CONTRAST EF (2CH): 55.1 ML/M^2
BH CV ECHO MEAS - CONTRAST EF 4CH: 56 ML/M^2
BH CV ECHO MEAS - EDV(CUBED): 112.7 ML
BH CV ECHO MEAS - EDV(MOD-SP2): 84.7 ML
BH CV ECHO MEAS - EDV(MOD-SP4): 90.6 ML
BH CV ECHO MEAS - EDV(TEICH): 109.1 ML
BH CV ECHO MEAS - EF(CUBED): 72.8 %
BH CV ECHO MEAS - EF(MOD-SP2): 55.1 %
BH CV ECHO MEAS - EF(MOD-SP4): 56 %
BH CV ECHO MEAS - EF(TEICH): 64.4 %
BH CV ECHO MEAS - ESV(CUBED): 30.7 ML
BH CV ECHO MEAS - ESV(MOD-SP2): 38 ML
BH CV ECHO MEAS - ESV(MOD-SP4): 39.9 ML
BH CV ECHO MEAS - ESV(TEICH): 38.8 ML
BH CV ECHO MEAS - FS: 35.2 %
BH CV ECHO MEAS - IVS/LVPW: 1
BH CV ECHO MEAS - IVSD: 0.88 CM
BH CV ECHO MEAS - LA DIMENSION: 3.2 CM
BH CV ECHO MEAS - LA/AO: 1
BH CV ECHO MEAS - LAT PEAK E' VEL: 9 CM/SEC
BH CV ECHO MEAS - LV DIASTOLIC VOL/BSA (35-75): 41.8 ML/M^2
BH CV ECHO MEAS - LV MASS(C)D: 143.1 GRAMS
BH CV ECHO MEAS - LV MASS(C)DI: 66 GRAMS/M^2
BH CV ECHO MEAS - LV MAX PG: 4.6 MMHG
BH CV ECHO MEAS - LV MEAN PG: 2 MMHG
BH CV ECHO MEAS - LV SYSTOLIC VOL/BSA (12-30): 18.4 ML/M^2
BH CV ECHO MEAS - LV V1 MAX: 107 CM/SEC
BH CV ECHO MEAS - LV V1 MEAN: 64 CM/SEC
BH CV ECHO MEAS - LV V1 VTI: 21.9 CM
BH CV ECHO MEAS - LVIDD: 4.8 CM
BH CV ECHO MEAS - LVIDS: 3.1 CM
BH CV ECHO MEAS - LVLD AP2: 8.3 CM
BH CV ECHO MEAS - LVLD AP4: 8.1 CM
BH CV ECHO MEAS - LVLS AP2: 7.1 CM
BH CV ECHO MEAS - LVLS AP4: 7 CM
BH CV ECHO MEAS - LVOT AREA (M): 3.5 CM^2
BH CV ECHO MEAS - LVOT AREA: 3.5 CM^2
BH CV ECHO MEAS - LVOT DIAM: 2.1 CM
BH CV ECHO MEAS - LVPWD: 0.86 CM
BH CV ECHO MEAS - MED PEAK E' VEL: 7 CM/SEC
BH CV ECHO MEAS - MV A DUR: 0.18 SEC
BH CV ECHO MEAS - MV A MAX VEL: 95.6 CM/SEC
BH CV ECHO MEAS - MV DEC SLOPE: 612 CM/SEC^2
BH CV ECHO MEAS - MV DEC TIME: 0.22 SEC
BH CV ECHO MEAS - MV E MAX VEL: 103 CM/SEC
BH CV ECHO MEAS - MV E/A: 1.1
BH CV ECHO MEAS - MV MAX PG: 6 MMHG
BH CV ECHO MEAS - MV MEAN PG: 2 MMHG
BH CV ECHO MEAS - MV P1/2T MAX VEL: 122 CM/SEC
BH CV ECHO MEAS - MV P1/2T: 58.4 MSEC
BH CV ECHO MEAS - MV V2 MAX: 122 CM/SEC
BH CV ECHO MEAS - MV V2 MEAN: 66 CM/SEC
BH CV ECHO MEAS - MV V2 VTI: 30.8 CM
BH CV ECHO MEAS - MVA P1/2T LCG: 1.8 CM^2
BH CV ECHO MEAS - MVA(P1/2T): 3.8 CM^2
BH CV ECHO MEAS - MVA(VTI): 2.5 CM^2
BH CV ECHO MEAS - PA ACC TIME: 0.14 SEC
BH CV ECHO MEAS - PA MAX PG (FULL): 1.3 MMHG
BH CV ECHO MEAS - PA MAX PG: 3.5 MMHG
BH CV ECHO MEAS - PA PR(ACCEL): 15.6 MMHG
BH CV ECHO MEAS - PA V2 MAX: 93.9 CM/SEC
BH CV ECHO MEAS - PULM A REVS DUR: 0.17 SEC
BH CV ECHO MEAS - PULM A REVS VEL: 35.3 CM/SEC
BH CV ECHO MEAS - PULM DIAS VEL: 53.7 CM/SEC
BH CV ECHO MEAS - PULM S/D: 0.97
BH CV ECHO MEAS - PULM SYS VEL: 52.2 CM/SEC
BH CV ECHO MEAS - PVA(V,A): 3.6 CM^2
BH CV ECHO MEAS - PVA(V,D): 3.6 CM^2
BH CV ECHO MEAS - QP/QS: 1.1
BH CV ECHO MEAS - RV MAX PG: 2.2 MMHG
BH CV ECHO MEAS - RV MEAN PG: 1 MMHG
BH CV ECHO MEAS - RV V1 MAX: 73.9 CM/SEC
BH CV ECHO MEAS - RV V1 MEAN: 49.8 CM/SEC
BH CV ECHO MEAS - RV V1 VTI: 18.5 CM
BH CV ECHO MEAS - RVOT AREA: 4.5 CM^2
BH CV ECHO MEAS - RVOT DIAM: 2.4 CM
BH CV ECHO MEAS - SI(AO): 120.5 ML/M^2
BH CV ECHO MEAS - SI(CUBED): 37.8 ML/M^2
BH CV ECHO MEAS - SI(LVOT): 35 ML/M^2
BH CV ECHO MEAS - SI(MOD-SP2): 21.5 ML/M^2
BH CV ECHO MEAS - SI(MOD-SP4): 23.4 ML/M^2
BH CV ECHO MEAS - SI(TEICH): 32.4 ML/M^2
BH CV ECHO MEAS - SV(AO): 261.4 ML
BH CV ECHO MEAS - SV(CUBED): 82 ML
BH CV ECHO MEAS - SV(LVOT): 75.9 ML
BH CV ECHO MEAS - SV(MOD-SP2): 46.7 ML
BH CV ECHO MEAS - SV(MOD-SP4): 50.7 ML
BH CV ECHO MEAS - SV(RVOT): 83.7 ML
BH CV ECHO MEAS - SV(TEICH): 70.3 ML
BH CV ECHO MEAS - TAPSE (>1.6): 2.5 CM2
BH CV NUCLEAR PRIOR STUDY: 3
BH CV STRESS BP STAGE 1: NORMAL
BH CV STRESS BP STAGE 2: NORMAL
BH CV STRESS DURATION MIN STAGE 1: 3
BH CV STRESS DURATION MIN STAGE 2: 1
BH CV STRESS DURATION SEC STAGE 1: 0
BH CV STRESS DURATION SEC STAGE 2: 34
BH CV STRESS GRADE STAGE 1: 10
BH CV STRESS GRADE STAGE 2: 12
BH CV STRESS HR STAGE 1: 121
BH CV STRESS HR STAGE 2: 138
BH CV STRESS METS STAGE 1: 5
BH CV STRESS METS STAGE 2: 7.5
BH CV STRESS PROTOCOL 1: NORMAL
BH CV STRESS RECOVERY BP: NORMAL MMHG
BH CV STRESS RECOVERY HR: 81 BPM
BH CV STRESS SPEED STAGE 1: 1.7
BH CV STRESS SPEED STAGE 2: 2.5
BH CV STRESS STAGE 1: 1
BH CV STRESS STAGE 2: 2
BH CV VAS BP LEFT ARM: NORMAL MMHG
BH CV XLRA - RV BASE: 3.5 CM
BH CV XLRA - TDI S': 12 CM/SEC
E/E' RATIO: 13
LEFT ATRIUM VOLUME INDEX: 24 ML/M2
LEFT ATRIUM VOLUME: 44 CM3
LV EF 2D ECHO EST: 56 %
LV EF NUC BP: 68 %
MAXIMAL PREDICTED HEART RATE: 155 BPM
PERCENT MAX PREDICTED HR: 89.03 %
STRESS BASELINE BP: NORMAL MMHG
STRESS BASELINE HR: 70 BPM
STRESS O2 SAT REST: 99 %
STRESS PERCENT HR: 105 %
STRESS POST ESTIMATED WORKLOAD: 6.5 METS
STRESS POST EXERCISE DUR MIN: 4 MIN
STRESS POST EXERCISE DUR SEC: 35 SEC
STRESS POST PEAK BP: NORMAL MMHG
STRESS POST PEAK HR: 138 BPM
STRESS TARGET HR: 132 BPM

## 2018-02-02 PROCEDURE — A9500 TC99M SESTAMIBI: HCPCS | Performed by: INTERNAL MEDICINE

## 2018-02-02 PROCEDURE — 93306 TTE W/DOPPLER COMPLETE: CPT

## 2018-02-02 PROCEDURE — 78452 HT MUSCLE IMAGE SPECT MULT: CPT | Performed by: INTERNAL MEDICINE

## 2018-02-02 PROCEDURE — 0 TECHNETIUM SESTAMIBI: Performed by: INTERNAL MEDICINE

## 2018-02-02 PROCEDURE — 93016 CV STRESS TEST SUPVJ ONLY: CPT | Performed by: INTERNAL MEDICINE

## 2018-02-02 PROCEDURE — 93017 CV STRESS TEST TRACING ONLY: CPT

## 2018-02-02 PROCEDURE — 93018 CV STRESS TEST I&R ONLY: CPT | Performed by: INTERNAL MEDICINE

## 2018-02-02 PROCEDURE — 93306 TTE W/DOPPLER COMPLETE: CPT | Performed by: INTERNAL MEDICINE

## 2018-02-02 PROCEDURE — 78452 HT MUSCLE IMAGE SPECT MULT: CPT

## 2018-02-02 RX ADMIN — TECHNETIUM TC 99M SESTAMIBI 1 DOSE: 1 INJECTION INTRAVENOUS at 10:45

## 2018-02-02 RX ADMIN — TECHNETIUM TC 99M SESTAMIBI 1 DOSE: 1 INJECTION INTRAVENOUS at 09:00

## 2018-02-05 ENCOUNTER — TELEPHONE (OUTPATIENT)
Dept: CARDIOLOGY | Facility: CLINIC | Age: 63
End: 2018-02-05

## 2018-02-05 RX ORDER — LOSARTAN POTASSIUM 50 MG/1
50 TABLET ORAL DAILY
Qty: 90 TABLET | Refills: 3 | Status: SHIPPED | OUTPATIENT
Start: 2018-02-05 | End: 2019-01-01 | Stop reason: SDUPTHER

## 2018-02-05 NOTE — TELEPHONE ENCOUNTER
----- Message from Herminia Neal MD sent at 2/5/2018 11:01 AM EST -----  Called and gave results. Start losartan 50mg daily.

## 2018-02-16 ENCOUNTER — TELEPHONE (OUTPATIENT)
Dept: CARDIOLOGY | Facility: CLINIC | Age: 63
End: 2018-02-16

## 2018-02-16 NOTE — TELEPHONE ENCOUNTER
Pt called and states that her BP is still elevated 150's - 90's. Pt will be dropping her BP reading log today. Informed pt that you will be leaving early today.         Current Med List   losartan 50 mg QD,   HCTZ 25mg QD,   toprol XL 25 QD.     Marianne HARPER

## 2018-02-19 RX ORDER — AMLODIPINE BESYLATE 2.5 MG/1
2.5 TABLET ORAL DAILY
Qty: 90 TABLET | Refills: 3 | Status: SHIPPED | OUTPATIENT
Start: 2018-02-19 | End: 2018-02-19 | Stop reason: SDUPTHER

## 2018-02-19 RX ORDER — AMLODIPINE BESYLATE 2.5 MG/1
2.5 TABLET ORAL DAILY
Qty: 90 TABLET | Refills: 3 | Status: SHIPPED | OUTPATIENT
Start: 2018-02-19 | End: 2019-01-01 | Stop reason: SDUPTHER

## 2018-02-19 NOTE — TELEPHONE ENCOUNTER
Pt called back and said since the last increase of losartan to 50mg that she had been having dizzy reactions and she is afraid if she increases more it will be worse. Pls advise or call [phone].

## 2018-02-19 NOTE — TELEPHONE ENCOUNTER
pls have her increase losartan to 100mg daily - I reviewed BP readings.  Let us know next week if BP better.     RM

## 2018-02-19 NOTE — TELEPHONE ENCOUNTER
Find out if she can take amlodipine 2.5mg daily for her bp and stay on losartan 50mg daily. I sent in med.

## 2018-03-15 ENCOUNTER — APPOINTMENT (OUTPATIENT)
Dept: WOMENS IMAGING | Facility: HOSPITAL | Age: 63
End: 2018-03-15

## 2018-03-15 PROCEDURE — 77063 BREAST TOMOSYNTHESIS BI: CPT | Performed by: RADIOLOGY

## 2018-03-15 PROCEDURE — 77067 SCR MAMMO BI INCL CAD: CPT | Performed by: RADIOLOGY

## 2018-03-20 ENCOUNTER — APPOINTMENT (OUTPATIENT)
Dept: WOMENS IMAGING | Facility: HOSPITAL | Age: 63
End: 2018-03-20
Attending: INTERNAL MEDICINE

## 2018-03-20 PROCEDURE — 76641 ULTRASOUND BREAST COMPLETE: CPT | Performed by: RADIOLOGY

## 2018-05-04 ENCOUNTER — OFFICE VISIT (OUTPATIENT)
Dept: CARDIOLOGY | Facility: CLINIC | Age: 63
End: 2018-05-04

## 2018-05-04 VITALS
SYSTOLIC BLOOD PRESSURE: 140 MMHG | HEART RATE: 67 BPM | BODY MASS INDEX: 29.03 KG/M2 | WEIGHT: 202.8 LBS | DIASTOLIC BLOOD PRESSURE: 80 MMHG | HEIGHT: 70 IN

## 2018-05-04 DIAGNOSIS — I10 BENIGN ESSENTIAL HYPERTENSION: Primary | ICD-10-CM

## 2018-05-04 DIAGNOSIS — E11.9 CONTROLLED TYPE 2 DIABETES MELLITUS WITHOUT COMPLICATION, WITHOUT LONG-TERM CURRENT USE OF INSULIN (HCC): ICD-10-CM

## 2018-05-04 DIAGNOSIS — E78.2 MIXED HYPERLIPIDEMIA: ICD-10-CM

## 2018-05-04 PROCEDURE — 93000 ELECTROCARDIOGRAM COMPLETE: CPT | Performed by: INTERNAL MEDICINE

## 2018-05-04 PROCEDURE — 99214 OFFICE O/P EST MOD 30 MIN: CPT | Performed by: INTERNAL MEDICINE

## 2018-05-04 RX ORDER — RANITIDINE 300 MG/1
2 CAPSULE ORAL DAILY
COMMUNITY
Start: 2018-02-26

## 2018-05-04 RX ORDER — BLOOD-GLUCOSE METER
1 KIT MISCELLANEOUS
COMMUNITY
Start: 2018-01-30 | End: 2019-01-01

## 2018-05-04 NOTE — PROGRESS NOTES
Date of Office Visit: 2018  Encounter Provider: Herminia Neal MD  Place of Service: [facility] CARDIOLOGY  Patient Name: Vero Pagan  :1955      Patient ID:  Vero Pagan is a 63 y.o. female is here for  followup for cardiac risks.         History of Present Illness       She has a known history of mild coronary artery disease and hypertension. She has a strong  family history of cardiovascular disease. She has had multiple stress studies in the past.  Her last stress echocardiogram 2011 at [facility] was normal showing  no ischemia.      She had a cardiac catheterization done 2009 showing mild nonobstructive coronary  artery disease with 20% left main stenosis, 30% diffuse LAD stenosis, 40% ostial OM-1  stenosis, 30 to 40% diffuse RCA stenosis, and ejection fraction of 62%.    She is known to have diabetes mellitus, hypertension, and hyperlipidemia. She has a  history of gastroesophageal reflux disease.       In 2015 she came in with chest pain which was happening at rest and with exertion.  We set her up for a stress nuclear perfusion study which was done 2015 and this showed no evidence of ischemia.    She had chest pain 2018.  She had a nonischemic stress nuclear perfusion study done 2018.  She did have a hypertensive response to exercise.  She also had an echocardiogram done which showed ejection fraction 56% with grade 2 diastolic dysfunction, thickened aortic valve without stenosis or insufficiency.    She has lost 17 pounds.  She is eating healthy and exercising.  She does have dizziness when she's standing but her blood pressure is still high here today.  Her blood pressure at home runs 120/70.  She has no tachycardia, syncope, exertional chest tightness.  She has no exertional dyspnea.  Overall she's feeling much better.         Past Medical History:   Diagnosis Date   • Abdominal pain, RUQ    • Acute  sinusitis    • Allergic    • Anterior chest wall pain    • Benign essential hypertension    • Chest pain    • Dizziness    • Fibrocystic disease of breast    • Fracture of wrist    • History of bone density study     osteopenia in left hip   • History of fibrocystic disease of breast    • History of hypertension    • History of mammogram    • History of menopause    • History of type 2 diabetes mellitus    • History of vitamin D deficiency    • Lower back pain    • Lumbosacral disc disease    • Lump or mass in breast    • Menopausal symptoms    • Plantar fasciitis of left foot    • Thumb pain    • Type 2 diabetes mellitus    • Urinary frequency    • Vertigo    • Vitamin D deficiency disease          Past Surgical History:   Procedure Laterality Date   • BACK SURGERY     • COLONOSCOPY      normal with polyps   • HYSTERECTOMY     • PAP SMEAR      normal   • TONSILLECTOMY      x2       Current Outpatient Prescriptions on File Prior to Visit   Medication Sig Dispense Refill   • amLODIPine (NORVASC) 2.5 MG tablet Take 1 tablet by mouth Daily. 90 tablet 3   • aspirin 81 MG tablet Take 1 tablet by mouth Daily.     • Cholecalciferol (VITAMIN D3) 5000 UNITS tablet Take  by mouth Daily.     • cyanocobalamin (VITAMIN B-12) 250 MCG tablet Take  by mouth.     • estradiol (CLIMARA) 0.05 MG/24HR patch Place 1 patch on the skin 1 (One) Time Per Week.     • Flaxseed, Linseed, (FLAXSEED OIL) 1000 MG capsule Take  by mouth Daily.     • folic acid (FOLVITE) 1 MG tablet Take 1 mg by mouth Daily.     • hydrochlorothiazide (HYDRODIURIL) 25 MG tablet Take 1 tablet by mouth Daily. 90 tablet 3   • losartan (COZAAR) 50 MG tablet Take 1 tablet by mouth Daily. 90 tablet 3   • metFORMIN (GLUCOPHAGE) 500 MG tablet Take 500 mg by mouth Daily With Breakfast.     • metoprolol succinate XL (TOPROL-XL) 25 MG 24 hr tablet Take 1 tablet by mouth Daily. 90 tablet 3   • Omega-3 Fatty Acids (FISH OIL PO) Take 1,000 mg by mouth Daily.     • simvastatin  (ZOCOR) 20 MG tablet Take 1 tablet by mouth Every Night. 90 tablet 3     No current facility-administered medications on file prior to visit.        Social History     Social History   • Marital status:      Spouse name: N/A   • Number of children: N/A   • Years of education: N/A     Occupational History   • Not on file.     Social History Main Topics   • Smoking status: Former Smoker   • Smokeless tobacco: Never Used      Comment: caffeine use   • Alcohol use Yes      Comment: being a social drinker   • Drug use: No   • Sexual activity: Defer     Other Topics Concern   • Not on file     Social History Narrative   • No narrative on file           Review of Systems   Constitution: Negative.   HENT: Negative for congestion.    Eyes: Negative for vision loss in left eye and vision loss in right eye.   Respiratory: Negative.  Negative for cough, hemoptysis, shortness of breath, sleep disturbances due to breathing, snoring, sputum production and wheezing.    Endocrine: Negative.    Hematologic/Lymphatic: Negative.    Skin: Negative for poor wound healing and rash.   Musculoskeletal: Negative for falls, gout, muscle cramps and myalgias.   Gastrointestinal: Negative for abdominal pain, diarrhea, dysphagia, hematemesis, melena, nausea and vomiting.   Neurological: Negative for excessive daytime sleepiness, dizziness, headaches, light-headedness, loss of balance, seizures and vertigo.   Psychiatric/Behavioral: Negative for depression and substance abuse. The patient is not nervous/anxious.        Procedures    ECG 12 Lead  Date/Time: 5/4/2018 2:25 PM  Performed by: JUNIOR HERRERA  Authorized by: JUNIOR HERRERA   Comparison: compared with previous ECG   Similar to previous ECG  Rhythm: sinus rhythm  QRS axis: left  Other findings: PRWP  Clinical impression: abnormal ECG                Objective:      Vitals:    05/04/18 1415   BP: 140/80   BP Location: Right arm   Patient Position: Sitting   Pulse: 67  "  Weight: 92 kg (202 lb 12.8 oz)   Height: 177.8 cm (70\")     Body mass index is 29.1 kg/m².    Physical Exam   Constitutional: She is oriented to person, place, and time. She appears well-developed and well-nourished. No distress.   HENT:   Head: Normocephalic and atraumatic.   Eyes: Conjunctivae are normal. No scleral icterus.   Neck: Neck supple. No JVD present. Carotid bruit is not present. No thyromegaly present.   Cardiovascular: Normal rate, regular rhythm, S1 normal, S2 normal, normal heart sounds and intact distal pulses.   No extrasystoles are present. PMI is not displaced.  Exam reveals no gallop.    No murmur heard.  Pulses:       Carotid pulses are 2+ on the right side, and 2+ on the left side.       Radial pulses are 2+ on the right side, and 2+ on the left side.        Dorsalis pedis pulses are 2+ on the right side, and 2+ on the left side.        Posterior tibial pulses are 2+ on the right side, and 2+ on the left side.   Pulmonary/Chest: Effort normal and breath sounds normal. No respiratory distress. She has no wheezes. She has no rhonchi. She has no rales. She exhibits no tenderness.   Abdominal: Soft. Bowel sounds are normal. She exhibits no distension, no abdominal bruit and no mass. There is no tenderness.   Musculoskeletal: She exhibits no edema or deformity.   Lymphadenopathy:     She has no cervical adenopathy.   Neurological: She is alert and oriented to person, place, and time. No cranial nerve deficit.   Skin: Skin is warm and dry. No rash noted. She is not diaphoretic. No cyanosis. No pallor. Nails show no clubbing.   Psychiatric: She has a normal mood and affect. Judgment normal.   Vitals reviewed.      Lab Review:       Assessment:      Diagnosis Plan   1. Benign essential hypertension     2. Mixed hyperlipidemia     3. Controlled type 2 diabetes mellitus without complication, without long-term current use of insulin             1. Normal stress nuclear study 11/2015. Normal stress " nuclear 2018.    2. Leftward axis on ECG.   3. Impaired glucose tolerance. This has improved with weight loss.   4. Strong family history of premature cardiovascular disease. Her father had a myocardial  infarction at age 63 and mother had bypass grafting. Her sister had a myocardial  infarction in her 30s and  with heart failure. Another sister had coronary artery  bypass grafting at age 61. Brother  at 56 with a myocardial infarction.   5. Hyperlipidemia on simvastatin.   6. Hypertension, controlled.   7. Grade 2 diastolic dysfunction on echo 2018.        Plan:       Continue with weight loss and exercise and will hopefully be able to d/cing BP meds.

## 2018-05-16 ENCOUNTER — TELEPHONE (OUTPATIENT)
Dept: CARDIOLOGY | Facility: CLINIC | Age: 63
End: 2018-05-16

## 2018-05-16 NOTE — TELEPHONE ENCOUNTER
Pt called and was having trouble with one of her eyes, she is wanting to know if her eye drops will affect her blood pressure.    Pt can be reached at [phone]

## 2018-07-30 ENCOUNTER — OFFICE VISIT (OUTPATIENT)
Dept: GASTROENTEROLOGY | Facility: CLINIC | Age: 63
End: 2018-07-30

## 2018-07-30 ENCOUNTER — HOSPITAL ENCOUNTER (OUTPATIENT)
Dept: GENERAL RADIOLOGY | Facility: HOSPITAL | Age: 63
Discharge: HOME | End: 2018-07-30
Attending: INTERNAL MEDICINE | Admitting: INTERNAL MEDICINE

## 2018-07-30 VITALS
SYSTOLIC BLOOD PRESSURE: 136 MMHG | BODY MASS INDEX: 27.77 KG/M2 | DIASTOLIC BLOOD PRESSURE: 78 MMHG | WEIGHT: 194 LBS | HEIGHT: 70 IN

## 2018-07-30 DIAGNOSIS — R10.31 RIGHT LOWER QUADRANT ABDOMINAL PAIN: Primary | ICD-10-CM

## 2018-07-30 DIAGNOSIS — K21.00 GASTROESOPHAGEAL REFLUX DISEASE WITH ESOPHAGITIS: ICD-10-CM

## 2018-07-30 DIAGNOSIS — R10.31 RIGHT LOWER QUADRANT ABDOMINAL PAIN: ICD-10-CM

## 2018-07-30 DIAGNOSIS — E11.9 CONTROLLED TYPE 2 DIABETES MELLITUS WITHOUT COMPLICATION, WITHOUT LONG-TERM CURRENT USE OF INSULIN (HCC): ICD-10-CM

## 2018-07-30 DIAGNOSIS — K59.04 CHRONIC IDIOPATHIC CONSTIPATION: ICD-10-CM

## 2018-07-30 PROCEDURE — 99214 OFFICE O/P EST MOD 30 MIN: CPT | Performed by: INTERNAL MEDICINE

## 2018-07-30 PROCEDURE — 74018 RADEX ABDOMEN 1 VIEW: CPT

## 2018-07-30 RX ORDER — POLYETHYLENE GLYCOL 3350 17 G/17G
17 POWDER, FOR SOLUTION ORAL DAILY
Qty: 30 PACKET | Refills: 12 | Status: SHIPPED | OUTPATIENT
Start: 2018-07-30

## 2018-07-30 NOTE — PROGRESS NOTES
PATIENT INFORMATION  Vero Pagan       - 1955    CHIEF COMPLAINT  Chief Complaint   Patient presents with   • Abdominal Pain   • Constipation       HISTORY OF PRESENT ILLNESS  Has been in the office for GERD an had an EGD/US in 9900-4930.  Mild constipation and RLQ pain and periumbilical location intermittantly and doesn't wake form sleep and will last for 30min but hard to account for . No assoc activity nor bowel pattern. Mild constipation of skipping 1-2 days and feels it is her healthier diet.   Last colons were  3 polyps and  with no polyps      Abdominal Pain   This is a new problem. The current episode started more than 1 year ago. The onset quality is gradual. The problem occurs every several days. The problem has been waxing and waning. The pain is located in the RLQ and periumbilical region. The pain is at a severity of 3/10. The pain is mild. The quality of the pain is dull and aching. The abdominal pain does not radiate. Associated symptoms include constipation. Nothing aggravates the pain. She has tried nothing for the symptoms. The treatment provided mild relief. Prior diagnostic workup includes upper endoscopy and lower endoscopy.   Constipation   Associated symptoms include abdominal pain.           REVIEW OF SYSTEMS  Review of Systems   Gastrointestinal: Positive for abdominal pain and constipation.   All other systems reviewed and are negative.        ACTIVE PROBLEMS  Patient Active Problem List    Diagnosis   • TFCC (triangular fibrocartilage complex) injury [S69.80XA]   • Left ear pain [H92.02]   • Right upper quadrant pain [R10.11]   • Anterior chest wall pain [R07.89]   • Benign essential hypertension [I10]   • Chest pain [R07.9]   • Controlled type 2 diabetes mellitus without complication (CMS/HCC) [E11.9]   • Dizziness [R42]   • Otalgia [H92.09]   • Fibrocystic breast changes [N60.19]   • Hyperlipidemia [E78.5]   • Low back pain [M54.5]   • Mass of breast [N63.0]   •  Menopausal symptom [N95.1]   • Thumb pain [M79.646]   • Increased frequency of urination [R35.0]   • Vertigo [R42]         PAST MEDICAL HISTORY  Past Medical History:   Diagnosis Date   • Abdominal pain, RUQ    • Acute sinusitis    • Allergic    • Anterior chest wall pain    • Benign essential hypertension    • Chest pain    • Dizziness    • Fibrocystic disease of breast    • Fracture of wrist    • History of bone density study     osteopenia in left hip   • History of fibrocystic disease of breast    • History of hypertension    • History of mammogram    • History of menopause    • History of type 2 diabetes mellitus    • History of vitamin D deficiency    • Lower back pain    • Lumbosacral disc disease    • Lump or mass in breast    • Menopausal symptoms    • Plantar fasciitis of left foot    • Thumb pain    • Type 2 diabetes mellitus (CMS/HCC)    • Urinary frequency    • Vertigo    • Vitamin D deficiency disease          SURGICAL HISTORY  Past Surgical History:   Procedure Laterality Date   • BACK SURGERY     • COLONOSCOPY      normal with polyps   • HYSTERECTOMY     • PAP SMEAR      normal   • TONSILLECTOMY      x2         FAMILY HISTORY  Family History   Problem Relation Age of Onset   • Heart disease Mother    • Cancer Mother         lung cancer   • Heart disease Father    • Diabetes Other    • Heart disease Other    • Ulcerative colitis Sister    • Colon cancer Neg Hx    • Colon polyps Neg Hx          SOCIAL HISTORY  Social History     Occupational History   • Not on file.     Social History Main Topics   • Smoking status: Former Smoker   • Smokeless tobacco: Never Used      Comment: caffeine use   • Alcohol use Yes      Comment: being a social drinker   • Drug use: No   • Sexual activity: Defer         CURRENT MEDICATIONS    Current Outpatient Prescriptions:   •  amLODIPine (NORVASC) 2.5 MG tablet, Take 1 tablet by mouth Daily., Disp: 90 tablet, Rfl: 3  •  aspirin 81 MG tablet, Take 1 tablet by mouth Daily.,  "Disp: , Rfl:   •  Blood Glucose Monitoring Suppl (BLOOD GLUCOSE SYSTEM SHAREE) kit, 1 each., Disp: , Rfl:   •  Cholecalciferol (VITAMIN D3) 5000 UNITS tablet, Take  by mouth Daily., Disp: , Rfl:   •  cyanocobalamin (VITAMIN B-12) 250 MCG tablet, Take  by mouth., Disp: , Rfl:   •  estradiol (CLIMARA) 0.05 MG/24HR patch, Place 1 patch on the skin 1 (One) Time Per Week., Disp: , Rfl:   •  Flaxseed, Linseed, (FLAXSEED OIL) 1000 MG capsule, Take  by mouth Daily., Disp: , Rfl:   •  folic acid (FOLVITE) 1 MG tablet, Take 1 mg by mouth Daily., Disp: , Rfl:   •  glucose blood test strip, 1 strip by Other route., Disp: , Rfl:   •  hydrochlorothiazide (HYDRODIURIL) 25 MG tablet, Take 1 tablet by mouth Daily., Disp: 90 tablet, Rfl: 3  •  Lancets Misc. misc, Inject 1 each under the skin., Disp: , Rfl:   •  losartan (COZAAR) 50 MG tablet, Take 1 tablet by mouth Daily., Disp: 90 tablet, Rfl: 3  •  metFORMIN (GLUCOPHAGE) 500 MG tablet, Take 500 mg by mouth Daily With Breakfast., Disp: , Rfl:   •  metoprolol succinate XL (TOPROL-XL) 25 MG 24 hr tablet, Take 1 tablet by mouth Daily., Disp: 90 tablet, Rfl: 3  •  Omega-3 Fatty Acids (FISH OIL PO), Take 1,000 mg by mouth Daily., Disp: , Rfl:   •  polyethylene glycol (MIRALAX) packet, Take 17 g by mouth Daily., Disp: 30 packet, Rfl: 12  •  ranitidine (ZANTAC) 300 MG capsule, Take 2 capsules by mouth Daily., Disp: , Rfl:   •  simvastatin (ZOCOR) 20 MG tablet, Take 1 tablet by mouth Every Night., Disp: 90 tablet, Rfl: 3    ALLERGIES  Amoxicillin-pot clavulanate; Levofloxacin; and Fluticasone    VITALS  Vitals:    07/30/18 1427   BP: 136/78   Weight: 88 kg (194 lb)   Height: 177.8 cm (70\")       LAST RESULTS   Hospital Outpatient Visit on 02/02/2018   Component Date Value Ref Range Status   • BSA 02/02/2018 2.2  m^2 Preliminary   • IVSd 02/02/2018 0.88  cm Preliminary   • LVIDd 02/02/2018 4.8  cm Preliminary   • LVIDs 02/02/2018 3.1  cm Preliminary   • LVPWd 02/02/2018 0.86  cm Preliminary   • " IVS/LVPW 02/02/2018 1.0   Preliminary   • FS 02/02/2018 35.2  % Preliminary   • EDV(Teich) 02/02/2018 109.1  ml Preliminary   • ESV(Teich) 02/02/2018 38.8  ml Preliminary   • EF(Teich) 02/02/2018 64.4  % Preliminary   • EDV(cubed) 02/02/2018 112.7  ml Preliminary   • ESV(cubed) 02/02/2018 30.7  ml Preliminary   • EF(cubed) 02/02/2018 72.8  % Preliminary   • LV mass(C)d 02/02/2018 143.1  grams Preliminary   • LV mass(C)dI 02/02/2018 66.0  grams/m^2 Preliminary   • SV(Teich) 02/02/2018 70.3  ml Preliminary   • SI(Teich) 02/02/2018 32.4  ml/m^2 Preliminary   • SV(cubed) 02/02/2018 82.0  ml Preliminary   • SI(cubed) 02/02/2018 37.8  ml/m^2 Preliminary   • Ao root diam 02/02/2018 3.2  cm Preliminary   • Ao root area 02/02/2018 8.0  cm^2 Preliminary   • ACS 02/02/2018 1.6  cm Preliminary   • LA dimension 02/02/2018 3.2  cm Preliminary   • LA/Ao 02/02/2018 1.0   Preliminary   • LVOT diam 02/02/2018 2.1  cm Preliminary   • LVOT area 02/02/2018 3.5  cm^2 Preliminary   • LVOT area(traced) 02/02/2018 3.5  cm^2 Preliminary   • RVOT diam 02/02/2018 2.4  cm Preliminary   • RVOT area 02/02/2018 4.5  cm^2 Preliminary   • LVLd ap4 02/02/2018 8.1  cm Preliminary   • EDV(MOD-sp4) 02/02/2018 90.6  ml Preliminary   • LVLs ap4 02/02/2018 7.0  cm Preliminary   • ESV(MOD-sp4) 02/02/2018 39.9  ml Preliminary   • EF(MOD-sp4) 02/02/2018 56.0  % Preliminary   • LVLd ap2 02/02/2018 8.3  cm Preliminary   • EDV(MOD-sp2) 02/02/2018 84.7  ml Preliminary   • LVLs ap2 02/02/2018 7.1  cm Preliminary   • ESV(MOD-sp2) 02/02/2018 38.0  ml Preliminary   • EF(MOD-sp2) 02/02/2018 55.1  % Preliminary   • SV(MOD-sp4) 02/02/2018 50.7  ml Preliminary   • SI(MOD-sp4) 02/02/2018 23.4  ml/m^2 Preliminary   • SV(MOD-sp2) 02/02/2018 46.7  ml Preliminary   • SI(MOD-sp2) 02/02/2018 21.5  ml/m^2 Preliminary   • Ao root area (BSA corrected) 02/02/2018 1.5   Preliminary   • Ao root area (BSA corrected) 02/02/2018 55.1  ml/m^2 Preliminary   • CONTRAST EF 4CH 02/02/2018  56.0  ml/m^2 Preliminary   • LV Diastolic corrected for BSA 02/02/2018 41.8  ml/m^2 Preliminary   • LV Systolic corrected for BSA 02/02/2018 18.4  ml/m^2 Preliminary   • MV A dur 02/02/2018 0.18  sec Preliminary   • MV E max citlalli 02/02/2018 103.0  cm/sec Preliminary   • MV A max citlalli 02/02/2018 95.6  cm/sec Preliminary   • MV E/A 02/02/2018 1.1   Preliminary   • MV V2 max 02/02/2018 122.0  cm/sec Preliminary   • MV max PG 02/02/2018 6.0  mmHg Preliminary   • MV V2 mean 02/02/2018 66.0  cm/sec Preliminary   • MV mean PG 02/02/2018 2.0  mmHg Preliminary   • MV V2 VTI 02/02/2018 30.8  cm Preliminary   • MVA(VTI) 02/02/2018 2.5  cm^2 Preliminary   • MV P1/2t max citlalli 02/02/2018 122.0  cm/sec Preliminary   • MV P1/2t 02/02/2018 58.4  msec Preliminary   • MVA(P1/2t) 02/02/2018 3.8  cm^2 Preliminary   • MV dec slope 02/02/2018 612.0  cm/sec^2 Preliminary   • MV dec time 02/02/2018 0.22  sec Preliminary   • Ao pk citlalli 02/02/2018 137.0  cm/sec Preliminary   • Ao max PG 02/02/2018 7.5  mmHg Preliminary   • Ao max PG (full) 02/02/2018 2.9  mmHg Preliminary   • Ao V2 mean 02/02/2018 86.5  cm/sec Preliminary   • Ao mean PG 02/02/2018 4.0  mmHg Preliminary   • Ao mean PG (full) 02/02/2018 2.0  mmHg Preliminary   • Ao V2 VTI 02/02/2018 32.5  cm Preliminary   • JERARDO(I,A) 02/02/2018 2.3  cm^2 Preliminary   • JERARDO(I,D) 02/02/2018 2.3  cm^2 Preliminary   • JERARDO(V,A) 02/02/2018 2.7  cm^2 Preliminary   • JERARDO(V,D) 02/02/2018 2.7  cm^2 Preliminary   • LV V1 max PG 02/02/2018 4.6  mmHg Preliminary   • LV V1 mean PG 02/02/2018 2.0  mmHg Preliminary   • LV V1 max 02/02/2018 107.0  cm/sec Preliminary   • LV V1 mean 02/02/2018 64.0  cm/sec Preliminary   • LV V1 VTI 02/02/2018 21.9  cm Preliminary   • SV(Ao) 02/02/2018 261.4  ml Preliminary   • SI(Ao) 02/02/2018 120.5  ml/m^2 Preliminary   • SV(LVOT) 02/02/2018 75.9  ml Preliminary   • SV(RVOT) 02/02/2018 83.7  ml Preliminary   • SI(LVOT) 02/02/2018 35.0  ml/m^2 Preliminary   • PA V2 max 02/02/2018  93.9  cm/sec Preliminary   • PA max PG 02/02/2018 3.5  mmHg Preliminary   • PA max PG (full) 02/02/2018 1.3  mmHg Preliminary   • BH CV ECHO HALEY - PVA(V,A) 02/02/2018 3.6  cm^2 Preliminary   • BH CV ECHO HALEY - PVA(V,D) 02/02/2018 3.6  cm^2 Preliminary   • PA acc time 02/02/2018 0.14  sec Preliminary   • RV V1 max PG 02/02/2018 2.2  mmHg Preliminary   • RV V1 mean PG 02/02/2018 1.0  mmHg Preliminary   • RV V1 max 02/02/2018 73.9  cm/sec Preliminary   • RV V1 mean 02/02/2018 49.8  cm/sec Preliminary   • RV V1 VTI 02/02/2018 18.5  cm Preliminary   • PA pr(Accel) 02/02/2018 15.6  mmHg Preliminary   • Pulm Sys Ganesh 02/02/2018 52.2  cm/sec Preliminary   • Pulm Peacock Ganesh 02/02/2018 53.7  cm/sec Preliminary   • Pulm S/D 02/02/2018 0.97   Preliminary   • Qp/Qs 02/02/2018 1.1   Preliminary   • Pulm A Revs Dur 02/02/2018 0.17  sec Preliminary   • Pulm A Revs Ganesh 02/02/2018 35.3  cm/sec Preliminary   • MVA P1/2T LCG 02/02/2018 1.8  cm^2 Preliminary   • BH CV ECHO HALEY - BZI_BMI 02/02/2018 31.4  kilograms/m^2 Preliminary   • BH CV ECHO HALEY - BSA(HAYCOCK) 02/02/2018 2.2  m^2 Preliminary   • BH CV ECHO HALEY - BZI_METRIC_WEIGHT 02/02/2018 99.3  kg Preliminary   • BH CV ECHO HALEY - BZI_METRIC_HEIGHT 02/02/2018 177.8  cm Preliminary   • BH CV VAS BP LEFT ARM 02/02/2018 163/68  mmHg Final   • TDI S' 02/02/2018 12.00  cm/sec Final   • RV Base 02/02/2018 3.50  cm Final   • LA volume 02/02/2018 44.0  cm3 Final   • E/E' ratio 02/02/2018 13.0   Final   • LA Volume Index 02/02/2018 24.0  mL/m2 Final   • Lat Peak E' Ganesh 02/02/2018 9.0  cm/sec Final   • Med Peak E' Ganesh 02/02/2018 7.00  cm/sec Final   • TAPSE (>1.6) 02/02/2018 2.50  cm2 Final   • Echo EF Estimated 02/02/2018 56  % Final     No results found.    PHYSICAL EXAM  Physical Exam   Constitutional: She is oriented to person, place, and time. She appears well-developed and well-nourished.   HENT:   Head: Normocephalic.   Mouth/Throat: Oropharynx is clear and moist.   Eyes: Pupils are  equal, round, and reactive to light. Conjunctivae and EOM are normal. No scleral icterus.   Neck: Normal range of motion. Neck supple. No thyromegaly present.   Cardiovascular: Normal rate, regular rhythm, normal heart sounds and intact distal pulses.  Exam reveals no gallop.    No murmur heard.  Pulmonary/Chest: Effort normal and breath sounds normal. She has no wheezes. She has no rales.   Abdominal: Soft. Bowel sounds are normal. She exhibits no shifting dullness, no distension, no fluid wave, no abdominal bruit, no ascites and no mass. There is no hepatosplenomegaly. There is tenderness in the right lower quadrant and epigastric area. There is no guarding and negative Rasmussen's sign. Hernia confirmed negative in the ventral area.   Musculoskeletal: Normal range of motion. She exhibits no edema.   Lymphadenopathy:     She has no cervical adenopathy.   Neurological: She is alert and oriented to person, place, and time.   Skin: Skin is warm and dry. No rash noted. She is not diaphoretic. No erythema.   Psychiatric: She has a normal mood and affect. Her behavior is normal.       ASSESSMENT  Diagnoses and all orders for this visit:    Right lower quadrant abdominal pain  -     XR Abdomen KUB; Future    Gastroesophageal reflux disease with esophagitis    Chronic idiopathic constipation    Controlled type 2 diabetes mellitus without complication, without long-term current use of insulin (CMS/[facility])    Other orders  -     polyethylene glycol (MIRALAX) packet; Take 17 g by mouth Daily.          PLAN  Return in about 2 months (around 9/30/2018).

## 2018-08-03 ENCOUNTER — OFFICE VISIT (OUTPATIENT)
Dept: CARDIOLOGY | Facility: CLINIC | Age: 63
End: 2018-08-03

## 2018-08-03 VITALS
DIASTOLIC BLOOD PRESSURE: 70 MMHG | SYSTOLIC BLOOD PRESSURE: 130 MMHG | HEART RATE: 60 BPM | HEIGHT: 70 IN | WEIGHT: 193.9 LBS | BODY MASS INDEX: 27.76 KG/M2

## 2018-08-03 DIAGNOSIS — E11.9 CONTROLLED TYPE 2 DIABETES MELLITUS WITHOUT COMPLICATION, WITHOUT LONG-TERM CURRENT USE OF INSULIN (HCC): ICD-10-CM

## 2018-08-03 DIAGNOSIS — I10 BENIGN ESSENTIAL HYPERTENSION: Primary | ICD-10-CM

## 2018-08-03 DIAGNOSIS — E78.2 MIXED HYPERLIPIDEMIA: ICD-10-CM

## 2018-08-03 PROCEDURE — 93000 ELECTROCARDIOGRAM COMPLETE: CPT | Performed by: INTERNAL MEDICINE

## 2018-08-03 PROCEDURE — 99214 OFFICE O/P EST MOD 30 MIN: CPT | Performed by: INTERNAL MEDICINE

## 2018-08-03 NOTE — PROGRESS NOTES
Date of Office Visit: 2018  Encounter Provider: Herminia Neal MD  Place of Service: [facility] CARDIOLOGY  Patient Name: Vero Pagan  :1955      Patient ID:  Vero Pagan is a 63 y.o. female is here for  followup for HTN.       History of Present Illness    She has a known history of mild coronary artery disease and hypertension. She has a strong  family history of cardiovascular disease. She has had multiple stress studies in the past.  Her last stress echocardiogram 2011 at [facility] was normal showing  no ischemia.      She had a cardiac catheterization done 2009 showing mild nonobstructive coronary  artery disease with 20% left main stenosis, 30% diffuse LAD stenosis, 40% ostial OM-1  stenosis, 30 to 40% diffuse RCA stenosis, and ejection fraction of 62%.     She is known to have diabetes mellitus, hypertension, and hyperlipidemia. She has a  history of gastroesophageal reflux disease.       In 2015 she came in with chest pain which was happening at rest and with exertion.  We set her up for a stress nuclear perfusion study which was done 2015 and this showed no evidence of ischemia.     She had chest pain 2018.  She had a nonischemic stress nuclear perfusion study done 2018.  She did have a hypertensive response to exercise.  She also had an echocardiogram done which showed ejection fraction 56% with grade 2 diastolic dysfunction, thickened aortic valve without stenosis or insufficiency.     She has lost 25 pounds.  She is eating healthy and exercising.  She does have dizziness when she's standing and her blood pressure is dropping.  She has no tachycardia, syncope, exertional chest tightness.  She has no exertional dyspnea.  Overall she's feeling much better.       Past Medical History:   Diagnosis Date   • Abdominal pain, RUQ    • Acute sinusitis    • Allergic    • Anterior chest wall pain    • Benign  essential hypertension    • Chest pain    • Dizziness    • Fibrocystic disease of breast    • Fracture of wrist    • History of bone density study     osteopenia in left hip   • History of fibrocystic disease of breast    • History of hypertension    • History of mammogram    • History of menopause    • History of type 2 diabetes mellitus    • History of vitamin D deficiency    • Lower back pain    • Lumbosacral disc disease    • Lump or mass in breast    • Menopausal symptoms    • Plantar fasciitis of left foot    • Thumb pain    • Type 2 diabetes mellitus (CMS/HCC)    • Urinary frequency    • Vertigo    • Vitamin D deficiency disease          Past Surgical History:   Procedure Laterality Date   • BACK SURGERY     • COLONOSCOPY      normal with polyps   • HYSTERECTOMY     • PAP SMEAR      normal   • TONSILLECTOMY      x2       Current Outpatient Prescriptions on File Prior to Visit   Medication Sig Dispense Refill   • amLODIPine (NORVASC) 2.5 MG tablet Take 1 tablet by mouth Daily. 90 tablet 3   • aspirin 81 MG tablet Take 1 tablet by mouth Daily.     • Blood Glucose Monitoring Suppl (BLOOD GLUCOSE SYSTEM SHAREE) kit 1 each.     • Cholecalciferol (VITAMIN D3) 5000 UNITS tablet Take  by mouth Daily.     • cyanocobalamin (VITAMIN B-12) 250 MCG tablet Take  by mouth.     • estradiol (CLIMARA) 0.05 MG/24HR patch Place 1 patch on the skin 1 (One) Time Per Week.     • Flaxseed, Linseed, (FLAXSEED OIL) 1000 MG capsule Take  by mouth Daily.     • folic acid (FOLVITE) 1 MG tablet Take 1 mg by mouth Daily.     • glucose blood test strip 1 strip by Other route.     • hydrochlorothiazide (HYDRODIURIL) 25 MG tablet Take 1 tablet by mouth Daily. 90 tablet 3   • Lancets Misc. misc Inject 1 each under the skin.     • losartan (COZAAR) 50 MG tablet Take 1 tablet by mouth Daily. 90 tablet 3   • metFORMIN (GLUCOPHAGE) 500 MG tablet Take 500 mg by mouth Daily With Breakfast.     • metoprolol succinate XL (TOPROL-XL) 25 MG 24 hr tablet  Take 1 tablet by mouth Daily. 90 tablet 3   • Omega-3 Fatty Acids (FISH OIL PO) Take 1,000 mg by mouth Daily.     • polyethylene glycol (MIRALAX) packet Take 17 g by mouth Daily. 30 packet 12   • ranitidine (ZANTAC) 300 MG capsule Take 2 capsules by mouth Daily.     • simvastatin (ZOCOR) 20 MG tablet Take 1 tablet by mouth Every Night. 90 tablet 3     No current facility-administered medications on file prior to visit.        Social History     Social History   • Marital status:      Spouse name: N/A   • Number of children: N/A   • Years of education: N/A     Occupational History   • Not on file.     Social History Main Topics   • Smoking status: Former Smoker   • Smokeless tobacco: Never Used      Comment: caffeine use   • Alcohol use Yes      Comment: being a social drinker   • Drug use: No   • Sexual activity: Defer     Other Topics Concern   • Not on file     Social History Narrative   • No narrative on file           Review of Systems   Constitution: Negative.   HENT: Negative for congestion.    Eyes: Negative for vision loss in left eye and vision loss in right eye.   Respiratory: Negative.  Negative for cough, hemoptysis, shortness of breath, sleep disturbances due to breathing, snoring, sputum production and wheezing.    Endocrine: Negative.    Hematologic/Lymphatic: Negative.    Skin: Negative for poor wound healing and rash.   Musculoskeletal: Negative for falls, gout, muscle cramps and myalgias.   Gastrointestinal: Negative for abdominal pain, diarrhea, dysphagia, hematemesis, melena, nausea and vomiting.   Neurological: Negative for excessive daytime sleepiness, dizziness, headaches, light-headedness, loss of balance, seizures and vertigo.   Psychiatric/Behavioral: Negative for depression and substance abuse. The patient is not nervous/anxious.        Procedures    ECG 12 Lead  Date/Time: 8/3/2018 9:33 AM  Performed by: JUNIOR HERRERA  Authorized by: JUNIOR HERRERA   Comparison:  "compared with previous ECG   Similar to previous ECG  Rhythm: sinus rhythm  QRS axis: left  Clinical impression: non-specific ECG                Objective:      Vitals:    08/03/18 0923   BP: 130/70   BP Location: Right arm   Patient Position: Sitting   Pulse: 60   Weight: 88 kg (193 lb 14.4 oz)   Height: 177.8 cm (70\")     Body mass index is 27.82 kg/m².    Physical Exam   Constitutional: She is oriented to person, place, and time. She appears well-developed and well-nourished. No distress.   HENT:   Head: Normocephalic and atraumatic.   Eyes: Conjunctivae are normal. No scleral icterus.   Neck: Neck supple. No JVD present. Carotid bruit is not present. No thyromegaly present.   Cardiovascular: Normal rate, regular rhythm, S1 normal, S2 normal, normal heart sounds and intact distal pulses.   No extrasystoles are present. PMI is not displaced.  Exam reveals no gallop.    No murmur heard.  Pulses:       Carotid pulses are 2+ on the right side, and 2+ on the left side.       Radial pulses are 2+ on the right side, and 2+ on the left side.        Dorsalis pedis pulses are 2+ on the right side, and 2+ on the left side.        Posterior tibial pulses are 2+ on the right side, and 2+ on the left side.   Pulmonary/Chest: Effort normal and breath sounds normal. No respiratory distress. She has no wheezes. She has no rhonchi. She has no rales. She exhibits no tenderness.   Abdominal: Soft. Bowel sounds are normal. She exhibits no distension, no abdominal bruit and no mass. There is no tenderness.   Musculoskeletal: She exhibits no edema or deformity.   Lymphadenopathy:     She has no cervical adenopathy.   Neurological: She is alert and oriented to person, place, and time. No cranial nerve deficit.   Skin: Skin is warm and dry. No rash noted. She is not diaphoretic. No cyanosis. No pallor. Nails show no clubbing.   Psychiatric: She has a normal mood and affect. Judgment normal.   Vitals reviewed.      Lab Review:     "   Assessment:      Diagnosis Plan   1. Benign essential hypertension     2. Mixed hyperlipidemia     3. Controlled type 2 diabetes mellitus without complication, without long-term current use of insulin (CMS/formerly [facility])       1. Normal stress nuclear study 2015. Normal stress nuclear 2018.    2. Leftward axis on ECG.   3. Impaired glucose tolerance. This has improved with weight loss.   4. Strong family history of premature cardiovascular disease. Her father had a myocardial  infarction at age 63 and mother had bypass grafting. Her sister had a myocardial  infarction in her 30s and  with heart failure. Another sister had coronary artery  bypass grafting at age 61. Brother  at 56 with a myocardial infarction.   5. Hyperlipidemia on simvastatin.   6. Hypertension, controlled.   7. Grade 2 diastolic dysfunction on echo 2018.        Plan:       See back in 6 months.  Stop amlodipine and toprol. She is doing great.

## 2018-10-01 ENCOUNTER — OFFICE VISIT (OUTPATIENT)
Dept: GASTROENTEROLOGY | Facility: CLINIC | Age: 63
End: 2018-10-01

## 2018-10-01 ENCOUNTER — LAB (OUTPATIENT)
Dept: LAB | Facility: HOSPITAL | Age: 63
End: 2018-10-01
Attending: INTERNAL MEDICINE

## 2018-10-01 VITALS — HEIGHT: 70 IN | WEIGHT: 187.2 LBS | BODY MASS INDEX: 26.8 KG/M2

## 2018-10-01 DIAGNOSIS — R10.31 RIGHT LOWER QUADRANT ABDOMINAL PAIN: ICD-10-CM

## 2018-10-01 DIAGNOSIS — R10.13 DYSPEPSIA: ICD-10-CM

## 2018-10-01 DIAGNOSIS — R10.13 DYSPEPSIA: Primary | ICD-10-CM

## 2018-10-01 DIAGNOSIS — K58.1 IRRITABLE BOWEL SYNDROME WITH CONSTIPATION: ICD-10-CM

## 2018-10-01 PROCEDURE — 99214 OFFICE O/P EST MOD 30 MIN: CPT | Performed by: INTERNAL MEDICINE

## 2018-10-01 PROCEDURE — 86677 HELICOBACTER PYLORI ANTIBODY: CPT

## 2018-10-01 PROCEDURE — 36415 COLL VENOUS BLD VENIPUNCTURE: CPT

## 2018-10-01 RX ORDER — OMEPRAZOLE 40 MG/1
40 CAPSULE, DELAYED RELEASE ORAL DAILY
Qty: 90 CAPSULE | Refills: 3 | Status: SHIPPED | OUTPATIENT
Start: 2018-10-01

## 2018-10-01 RX ORDER — HYOSCYAMINE SULFATE EXTENDED-RELEASE 0.38 MG/1
0.38 TABLET ORAL EVERY 12 HOURS PRN
Qty: 60 TABLET | Refills: 12 | Status: SHIPPED | OUTPATIENT
Start: 2018-10-01

## 2018-10-01 RX ORDER — OMEPRAZOLE 40 MG/1
CAPSULE, DELAYED RELEASE ORAL
COMMUNITY
Start: 2018-09-21 | End: 2018-10-01 | Stop reason: SDUPTHER

## 2018-10-01 NOTE — PROGRESS NOTES
PATIENT INFORMATION  Vero Pagan       - 1955    CHIEF COMPLAINT  Chief Complaint   Patient presents with   • Abdominal Pain     2 mo follow up    • Constipation       HISTORY OF PRESENT ILLNESS  Here for Constipation and still with mid to RLQ pain on Miralax with small BMs daily.    Reviewed her EGD and colon 2015 Diverticulosis only and rescreen in 5 years      Abdominal Pain   Associated symptoms include constipation.   Constipation   Associated symptoms include abdominal pain.           REVIEW OF SYSTEMS  Review of Systems   HENT: Positive for postnasal drip and rhinorrhea.    Respiratory: Positive for chest tightness.    Gastrointestinal: Positive for abdominal pain and constipation.   Psychiatric/Behavioral: The patient is nervous/anxious.    All other systems reviewed and are negative.        ACTIVE PROBLEMS  Patient Active Problem List    Diagnosis   • TFCC (triangular fibrocartilage complex) injury [S69.80XA]   • Left ear pain [H92.02]   • Right upper quadrant pain [R10.11]   • Anterior chest wall pain [R07.89]   • Benign essential hypertension [I10]   • Chest pain [R07.9]   • Controlled type 2 diabetes mellitus without complication (CMS/HCC) [E11.9]   • Dizziness [R42]   • Otalgia [H92.09]   • Fibrocystic breast changes [N60.19]   • Hyperlipidemia [E78.5]   • Low back pain [M54.5]   • Mass of breast [N63.0]   • Menopausal symptom [N95.1]   • Thumb pain [M79.646]   • Increased frequency of urination [R35.0]   • Vertigo [R42]         PAST MEDICAL HISTORY  Past Medical History:   Diagnosis Date   • Abdominal pain, RUQ    • Acute sinusitis    • Allergic    • Anterior chest wall pain    • Benign essential hypertension    • Chest pain    • Dizziness    • Fibrocystic disease of breast    • Fracture of wrist    • History of bone density study     osteopenia in left hip   • History of fibrocystic disease of breast    • History of hypertension    • History of mammogram    • History of menopause    •  History of type 2 diabetes mellitus    • History of vitamin D deficiency    • Lower back pain    • Lumbosacral disc disease    • Lump or mass in breast    • Menopausal symptoms    • Plantar fasciitis of left foot    • Thumb pain    • Type 2 diabetes mellitus (CMS/HCC)    • Urinary frequency    • Vertigo    • Vitamin D deficiency disease          SURGICAL HISTORY  Past Surgical History:   Procedure Laterality Date   • BACK SURGERY     • COLONOSCOPY      normal with polyps   • HYSTERECTOMY     • PAP SMEAR      normal   • TONSILLECTOMY      x2         FAMILY HISTORY  Family History   Problem Relation Age of Onset   • Heart disease Mother    • Cancer Mother         lung cancer   • Heart disease Father    • Diabetes Other    • Heart disease Other    • Ulcerative colitis Sister    • Colon cancer Neg Hx    • Colon polyps Neg Hx          SOCIAL HISTORY  Social History     Occupational History   • Not on file.     Social History Main Topics   • Smoking status: Former Smoker   • Smokeless tobacco: Never Used      Comment: caffeine use   • Alcohol use Yes      Comment: being a social drinker   • Drug use: No   • Sexual activity: Defer         CURRENT MEDICATIONS    Current Outpatient Prescriptions:   •  amLODIPine (NORVASC) 2.5 MG tablet, Take 1 tablet by mouth Daily., Disp: 90 tablet, Rfl: 3  •  aspirin 81 MG tablet, Take 1 tablet by mouth Daily., Disp: , Rfl:   •  BL CALCIUM-MAGNESIUM-ZINC PO, Take  by mouth., Disp: , Rfl:   •  Blood Glucose Monitoring Suppl (BLOOD GLUCOSE SYSTEM SHAREE) kit, 1 each., Disp: , Rfl:   •  Cholecalciferol (VITAMIN D3) 5000 UNITS tablet, Take  by mouth Daily., Disp: , Rfl:   •  cyanocobalamin (VITAMIN B-12) 250 MCG tablet, Take  by mouth., Disp: , Rfl:   •  estradiol (CLIMARA) 0.05 MG/24HR patch, Place 1 patch on the skin 1 (One) Time Per Week., Disp: , Rfl:   •  Flaxseed, Linseed, (FLAXSEED OIL) 1000 MG capsule, Take  by mouth Daily., Disp: , Rfl:   •  folic acid (FOLVITE) 1 MG tablet, Take 1 mg by  "mouth Daily., Disp: , Rfl:   •  glucose blood test strip, 1 strip by Other route., Disp: , Rfl:   •  hydrochlorothiazide (HYDRODIURIL) 25 MG tablet, Take 1 tablet by mouth Daily., Disp: 90 tablet, Rfl: 3  •  hyoscyamine (LEVBID) 0.375 MG 12 hr tablet, Take 1 tablet by mouth Every 12 (Twelve) Hours As Needed for Cramping., Disp: 60 tablet, Rfl: 12  •  Lancets Misc. misc, Inject 1 each under the skin., Disp: , Rfl:   •  losartan (COZAAR) 50 MG tablet, Take 1 tablet by mouth Daily., Disp: 90 tablet, Rfl: 3  •  metFORMIN (GLUCOPHAGE) 500 MG tablet, Take 500 mg by mouth Daily With Breakfast., Disp: , Rfl:   •  metoprolol succinate XL (TOPROL-XL) 25 MG 24 hr tablet, Take 1 tablet by mouth Daily., Disp: 90 tablet, Rfl: 3  •  Omega-3 Fatty Acids (FISH OIL PO), Take 1,000 mg by mouth Daily., Disp: , Rfl:   •  omeprazole (priLOSEC) 40 MG capsule, Take 1 capsule by mouth Daily., Disp: 90 capsule, Rfl: 3  •  polyethylene glycol (MIRALAX) packet, Take 17 g by mouth Daily., Disp: 30 packet, Rfl: 12  •  ranitidine (ZANTAC) 300 MG capsule, Take 2 capsules by mouth Daily., Disp: , Rfl:   •  simvastatin (ZOCOR) 20 MG tablet, Take 1 tablet by mouth Every Night., Disp: 90 tablet, Rfl: 3    ALLERGIES  Amoxicillin-pot clavulanate; Levofloxacin; and Fluticasone    VITALS  Vitals:    10/01/18 1411   Weight: 84.9 kg (187 lb 3.2 oz)   Height: 177.8 cm (70\")       LAST RESULTS   Hospital Outpatient Visit on 02/02/2018   Component Date Value Ref Range Status   • BSA 02/02/2018 2.2  m^2 Preliminary   • IVSd 02/02/2018 0.88  cm Preliminary   • LVIDd 02/02/2018 4.8  cm Preliminary   • LVIDs 02/02/2018 3.1  cm Preliminary   • LVPWd 02/02/2018 0.86  cm Preliminary   • IVS/LVPW 02/02/2018 1.0   Preliminary   • FS 02/02/2018 35.2  % Preliminary   • EDV(Teich) 02/02/2018 109.1  ml Preliminary   • ESV(Teich) 02/02/2018 38.8  ml Preliminary   • EF(Teich) 02/02/2018 64.4  % Preliminary   • EDV(cubed) 02/02/2018 112.7  ml Preliminary   • ESV(cubed) " 02/02/2018 30.7  ml Preliminary   • EF(cubed) 02/02/2018 72.8  % Preliminary   • LV mass(C)d 02/02/2018 143.1  grams Preliminary   • LV mass(C)dI 02/02/2018 66.0  grams/m^2 Preliminary   • SV(Teich) 02/02/2018 70.3  ml Preliminary   • SI(Teich) 02/02/2018 32.4  ml/m^2 Preliminary   • SV(cubed) 02/02/2018 82.0  ml Preliminary   • SI(cubed) 02/02/2018 37.8  ml/m^2 Preliminary   • Ao root diam 02/02/2018 3.2  cm Preliminary   • Ao root area 02/02/2018 8.0  cm^2 Preliminary   • ACS 02/02/2018 1.6  cm Preliminary   • LA dimension 02/02/2018 3.2  cm Preliminary   • LA/Ao 02/02/2018 1.0   Preliminary   • LVOT diam 02/02/2018 2.1  cm Preliminary   • LVOT area 02/02/2018 3.5  cm^2 Preliminary   • LVOT area(traced) 02/02/2018 3.5  cm^2 Preliminary   • RVOT diam 02/02/2018 2.4  cm Preliminary   • RVOT area 02/02/2018 4.5  cm^2 Preliminary   • LVLd ap4 02/02/2018 8.1  cm Preliminary   • EDV(MOD-sp4) 02/02/2018 90.6  ml Preliminary   • LVLs ap4 02/02/2018 7.0  cm Preliminary   • ESV(MOD-sp4) 02/02/2018 39.9  ml Preliminary   • EF(MOD-sp4) 02/02/2018 56.0  % Preliminary   • LVLd ap2 02/02/2018 8.3  cm Preliminary   • EDV(MOD-sp2) 02/02/2018 84.7  ml Preliminary   • LVLs ap2 02/02/2018 7.1  cm Preliminary   • ESV(MOD-sp2) 02/02/2018 38.0  ml Preliminary   • EF(MOD-sp2) 02/02/2018 55.1  % Preliminary   • SV(MOD-sp4) 02/02/2018 50.7  ml Preliminary   • SI(MOD-sp4) 02/02/2018 23.4  ml/m^2 Preliminary   • SV(MOD-sp2) 02/02/2018 46.7  ml Preliminary   • SI(MOD-sp2) 02/02/2018 21.5  ml/m^2 Preliminary   • Ao root area (BSA corrected) 02/02/2018 1.5   Preliminary   • EF - Contrast (2Ch) 02/02/2018 55.1  ml/m^2 Preliminary   • EF - Contrast (4Ch) 02/02/2018 56.0  ml/m^2 Preliminary   • LV Peacock Vol (BSA corrected) 02/02/2018 41.8  ml/m^2 Preliminary   • LV Sys Vol (BSA corrected) 02/02/2018 18.4  ml/m^2 Preliminary   • MV A dur 02/02/2018 0.18  sec Preliminary   • MV E max citlalli 02/02/2018 103.0  cm/sec Preliminary   • MV A max citlalli  02/02/2018 95.6  cm/sec Preliminary   • MV E/A 02/02/2018 1.1   Preliminary   • MV V2 max 02/02/2018 122.0  cm/sec Preliminary   • MV max PG 02/02/2018 6.0  mmHg Preliminary   • MV V2 mean 02/02/2018 66.0  cm/sec Preliminary   • MV mean PG 02/02/2018 2.0  mmHg Preliminary   • MV V2 VTI 02/02/2018 30.8  cm Preliminary   • MVA(VTI) 02/02/2018 2.5  cm^2 Preliminary   • MV P1/2t max citlalli 02/02/2018 122.0  cm/sec Preliminary   • MV P1/2t 02/02/2018 58.4  msec Preliminary   • MVA(P1/2t) 02/02/2018 3.8  cm^2 Preliminary   • MV dec slope 02/02/2018 612.0  cm/sec^2 Preliminary   • MV dec time 02/02/2018 0.22  sec Preliminary   • Ao pk citlalli 02/02/2018 137.0  cm/sec Preliminary   • Ao max PG 02/02/2018 7.5  mmHg Preliminary   • Ao max PG (full) 02/02/2018 2.9  mmHg Preliminary   • Ao V2 mean 02/02/2018 86.5  cm/sec Preliminary   • Ao mean PG 02/02/2018 4.0  mmHg Preliminary   • Ao mean PG (full) 02/02/2018 2.0  mmHg Preliminary   • Ao V2 VTI 02/02/2018 32.5  cm Preliminary   • JERARDO(I,A) 02/02/2018 2.3  cm^2 Preliminary   • JERARDO(I,D) 02/02/2018 2.3  cm^2 Preliminary   • JERARDO(V,A) 02/02/2018 2.7  cm^2 Preliminary   • JERARDO(V,D) 02/02/2018 2.7  cm^2 Preliminary   • LV V1 max PG 02/02/2018 4.6  mmHg Preliminary   • LV V1 mean PG 02/02/2018 2.0  mmHg Preliminary   • LV V1 max 02/02/2018 107.0  cm/sec Preliminary   • LV V1 mean 02/02/2018 64.0  cm/sec Preliminary   • LV V1 VTI 02/02/2018 21.9  cm Preliminary   • SV(Ao) 02/02/2018 261.4  ml Preliminary   • SI(Ao) 02/02/2018 120.5  ml/m^2 Preliminary   • SV(LVOT) 02/02/2018 75.9  ml Preliminary   • SV(RVOT) 02/02/2018 83.7  ml Preliminary   • SI(LVOT) 02/02/2018 35.0  ml/m^2 Preliminary   • PA V2 max 02/02/2018 93.9  cm/sec Preliminary   • PA max PG 02/02/2018 3.5  mmHg Preliminary   • PA max PG (full) 02/02/2018 1.3  mmHg Preliminary   • BH CV ECHO HALEY - PVA(V,A) 02/02/2018 3.6  cm^2 Preliminary   • BH CV ECHO HALEY - PVA(V,D) 02/02/2018 3.6  cm^2 Preliminary   • PA acc time 02/02/2018 0.14   sec Preliminary   • RV V1 max PG 02/02/2018 2.2  mmHg Preliminary   • RV V1 mean PG 02/02/2018 1.0  mmHg Preliminary   • RV V1 max 02/02/2018 73.9  cm/sec Preliminary   • RV V1 mean 02/02/2018 49.8  cm/sec Preliminary   • RV V1 VTI 02/02/2018 18.5  cm Preliminary   • PA pr(Accel) 02/02/2018 15.6  mmHg Preliminary   • Pulm Sys Ganesh 02/02/2018 52.2  cm/sec Preliminary   • Pulm Peacock Ganesh 02/02/2018 53.7  cm/sec Preliminary   • Pulm S/D 02/02/2018 0.97   Preliminary   • Qp/Qs 02/02/2018 1.1   Preliminary   • Pulm A Revs Dur 02/02/2018 0.17  sec Preliminary   • Pulm A Revs Ganesh 02/02/2018 35.3  cm/sec Preliminary   • MVA P1/2T LCG 02/02/2018 1.8  cm^2 Preliminary   • BH CV ECHO HALEY - BZI_BMI 02/02/2018 31.4  kilograms/m^2 Preliminary   • BH CV ECHO HALEY - BSA(HAYCOCK) 02/02/2018 2.2  m^2 Preliminary   • BH CV ECHO HALEY - BZI_METRIC_WEIGHT 02/02/2018 99.3  kg Preliminary   • BH CV ECHO HALEY - BZI_METRIC_HEIGHT 02/02/2018 177.8  cm Preliminary   • BH CV VAS BP LEFT ARM 02/02/2018 163/68  mmHg Final   • TDI S' 02/02/2018 12.00  cm/sec Final   • RV Base 02/02/2018 3.50  cm Final   • LA volume 02/02/2018 44.0  cm3 Final   • E/E' ratio 02/02/2018 13.0   Final   • LA Volume Index 02/02/2018 24.0  mL/m2 Final   • Lat Peak E' Ganesh 02/02/2018 9.0  cm/sec Final   • Med Peak E' Ganesh 02/02/2018 7.00  cm/sec Final   • TAPSE (>1.6) 02/02/2018 2.50  cm2 Final   • Echo EF Estimated 02/02/2018 56  % Final     No results found.    PHYSICAL EXAM  Physical Exam   Constitutional: She is oriented to person, place, and time. She appears well-developed and well-nourished.   Eyes: Pupils are equal, round, and reactive to light. Conjunctivae and EOM are normal. No scleral icterus.   Neck: Normal range of motion. Neck supple. No thyromegaly present.   Cardiovascular: Normal rate, regular rhythm, normal heart sounds and intact distal pulses.  Exam reveals no gallop.    No murmur heard.  Pulmonary/Chest: Effort normal and breath sounds normal. She has  no wheezes. She has no rales.   Abdominal: Soft. Bowel sounds are normal. She exhibits no shifting dullness, no distension, no fluid wave, no abdominal bruit, no ascites and no mass. There is no hepatosplenomegaly. There is tenderness in the right upper quadrant, right lower quadrant, epigastric area and left upper quadrant. There is no guarding and negative Rasmussen's sign. Hernia confirmed negative in the ventral area.   Musculoskeletal: Normal range of motion. She exhibits no edema.   Lymphadenopathy:     She has no cervical adenopathy.   Neurological: She is alert and oriented to person, place, and time.   Skin: Skin is warm and dry. No rash noted. She is not diaphoretic. No erythema.   Psychiatric: She has a normal mood and affect. Her behavior is normal.       ASSESSMENT  Diagnoses and all orders for this visit:    Dyspepsia  -     Helicobacter Pylori, IgA IgG IgM; Future    Irritable bowel syndrome with constipation    Right lower quadrant abdominal pain    Other orders  -     Discontinue: omeprazole (priLOSEC) 40 MG capsule;   -     omeprazole (priLOSEC) 40 MG capsule; Take 1 capsule by mouth Daily.  -     hyoscyamine (LEVBID) 0.375 MG 12 hr tablet; Take 1 tablet by mouth Every 12 (Twelve) Hours As Needed for Cramping.          PLAN  Return in about 2 months (around 12/1/2018).

## 2018-10-02 ENCOUNTER — APPOINTMENT (OUTPATIENT)
Dept: WOMENS IMAGING | Facility: HOSPITAL | Age: 63
End: 2018-10-02

## 2018-10-02 LAB
H PYLORI IGA SER IA-ACNC: <9 UNITS (ref 0–8.9)
H PYLORI IGG SER IA-ACNC: 0.86 INDEX VALUE (ref 0–0.79)
H PYLORI IGM SER-ACNC: <9 UNITS (ref 0–8.9)

## 2018-10-02 PROCEDURE — 76641 ULTRASOUND BREAST COMPLETE: CPT | Performed by: RADIOLOGY

## 2019-01-01 ENCOUNTER — APPOINTMENT (OUTPATIENT)
Dept: WOMENS IMAGING | Facility: HOSPITAL | Age: 64
End: 2019-01-01

## 2019-01-01 ENCOUNTER — TRANSCRIBE ORDERS (OUTPATIENT)
Dept: FAMILY MEDICINE CLINIC | Facility: CLINIC | Age: 64
End: 2019-01-01

## 2019-01-01 ENCOUNTER — TELEPHONE (OUTPATIENT)
Dept: CARDIOLOGY | Facility: CLINIC | Age: 64
End: 2019-01-01

## 2019-01-01 ENCOUNTER — OFFICE VISIT (OUTPATIENT)
Dept: ORTHOPEDIC SURGERY | Facility: CLINIC | Age: 64
End: 2019-01-01

## 2019-01-01 ENCOUNTER — HOSPITAL ENCOUNTER (OUTPATIENT)
Dept: MRI IMAGING | Facility: HOSPITAL | Age: 64
Discharge: HOME | End: 2019-12-26

## 2019-01-01 ENCOUNTER — APPOINTMENT (OUTPATIENT)
Dept: MRI IMAGING | Facility: HOSPITAL | Age: 64
End: 2019-01-01

## 2019-01-01 ENCOUNTER — OFFICE VISIT (OUTPATIENT)
Dept: CARDIOLOGY | Facility: CLINIC | Age: 64
End: 2019-01-01

## 2019-01-01 ENCOUNTER — HOSPITAL ENCOUNTER (OUTPATIENT)
Dept: ULTRASOUND IMAGING | Facility: HOSPITAL | Age: 64
Discharge: HOME | End: 2019-08-20
Admitting: FAMILY MEDICINE

## 2019-01-01 ENCOUNTER — TRANSCRIBE ORDERS (OUTPATIENT)
Dept: ADMINISTRATIVE | Facility: HOSPITAL | Age: 64
End: 2019-01-01

## 2019-01-01 VITALS
HEIGHT: 70 IN | BODY MASS INDEX: 27.7 KG/M2 | HEART RATE: 67 BPM | WEIGHT: 193.5 LBS | SYSTOLIC BLOOD PRESSURE: 120 MMHG | DIASTOLIC BLOOD PRESSURE: 76 MMHG

## 2019-01-01 DIAGNOSIS — E78.2 MIXED HYPERLIPIDEMIA: ICD-10-CM

## 2019-01-01 DIAGNOSIS — R10.11 ABDOMINAL PAIN, RIGHT UPPER QUADRANT: ICD-10-CM

## 2019-01-01 DIAGNOSIS — R10.11 ABDOMINAL PAIN, RIGHT UPPER QUADRANT: Primary | ICD-10-CM

## 2019-01-01 DIAGNOSIS — R52 PAIN: Primary | ICD-10-CM

## 2019-01-01 DIAGNOSIS — H93.13 TINNITUS OF BOTH EARS: Primary | ICD-10-CM

## 2019-01-01 DIAGNOSIS — M17.12 OSTEOARTHRITIS OF LEFT PATELLOFEMORAL JOINT: ICD-10-CM

## 2019-01-01 DIAGNOSIS — I10 BENIGN ESSENTIAL HYPERTENSION: Primary | ICD-10-CM

## 2019-01-01 DIAGNOSIS — H90.0 CONDUCTIVE HEARING LOSS, BILATERAL: ICD-10-CM

## 2019-01-01 DIAGNOSIS — H93.13 TINNITUS OF BOTH EARS: ICD-10-CM

## 2019-01-01 PROCEDURE — 76641 ULTRASOUND BREAST COMPLETE: CPT | Performed by: RADIOLOGY

## 2019-01-01 PROCEDURE — 99214 OFFICE O/P EST MOD 30 MIN: CPT | Performed by: INTERNAL MEDICINE

## 2019-01-01 PROCEDURE — 20610 DRAIN/INJ JOINT/BURSA W/O US: CPT | Performed by: NURSE PRACTITIONER

## 2019-01-01 PROCEDURE — G0279 TOMOSYNTHESIS, MAMMO: HCPCS | Performed by: RADIOLOGY

## 2019-01-01 PROCEDURE — 77065 DX MAMMO INCL CAD UNI: CPT | Performed by: RADIOLOGY

## 2019-01-01 PROCEDURE — 73562 X-RAY EXAM OF KNEE 3: CPT | Performed by: NURSE PRACTITIONER

## 2019-01-01 PROCEDURE — 19083 BX BREAST 1ST LESION US IMAG: CPT | Performed by: RADIOLOGY

## 2019-01-01 PROCEDURE — 99213 OFFICE O/P EST LOW 20 MIN: CPT | Performed by: NURSE PRACTITIONER

## 2019-01-01 PROCEDURE — 77063 BREAST TOMOSYNTHESIS BI: CPT | Performed by: RADIOLOGY

## 2019-01-01 PROCEDURE — 76700 US EXAM ABDOM COMPLETE: CPT

## 2019-01-01 PROCEDURE — 93000 ELECTROCARDIOGRAM COMPLETE: CPT | Performed by: INTERNAL MEDICINE

## 2019-01-01 PROCEDURE — 77067 SCR MAMMO BI INCL CAD: CPT | Performed by: RADIOLOGY

## 2019-01-01 PROCEDURE — 19000 PUNCTURE ASPIR CYST BREAST: CPT | Performed by: RADIOLOGY

## 2019-01-01 RX ORDER — RAMIPRIL 10 MG/1
10 CAPSULE ORAL DAILY
Qty: 90 CAPSULE | Refills: 1 | Status: SHIPPED | OUTPATIENT
Start: 2019-01-01 | End: 2019-01-01 | Stop reason: SDUPTHER

## 2019-01-01 RX ORDER — LIDOCAINE HYDROCHLORIDE 10 MG/ML
8 INJECTION, SOLUTION EPIDURAL; INFILTRATION; INTRACAUDAL; PERINEURAL
Status: COMPLETED | OUTPATIENT
Start: 2019-01-01 | End: 2019-01-01

## 2019-01-01 RX ORDER — TRIAMCINOLONE ACETONIDE 40 MG/ML
80 INJECTION, SUSPENSION INTRA-ARTICULAR; INTRAMUSCULAR
Status: COMPLETED | OUTPATIENT
Start: 2019-01-01 | End: 2019-01-01

## 2019-01-01 RX ORDER — LOSARTAN POTASSIUM 50 MG/1
50 TABLET ORAL DAILY
Qty: 90 TABLET | Refills: 1 | Status: SHIPPED | OUTPATIENT
Start: 2019-01-01 | End: 2019-01-01

## 2019-01-01 RX ORDER — SIMVASTATIN 20 MG
20 TABLET ORAL NIGHTLY
Qty: 90 TABLET | Refills: 1 | Status: SHIPPED | OUTPATIENT
Start: 2019-01-01 | End: 2019-01-01 | Stop reason: SDUPTHER

## 2019-01-01 RX ORDER — LOSARTAN POTASSIUM 50 MG/1
50 TABLET ORAL DAILY
Qty: 90 TABLET | Refills: 0 | Status: SHIPPED | OUTPATIENT
Start: 2019-01-01 | End: 2019-01-01 | Stop reason: SDUPTHER

## 2019-01-01 RX ORDER — RAMIPRIL 10 MG/1
10 CAPSULE ORAL DAILY
Qty: 90 CAPSULE | Refills: 1 | Status: SHIPPED | OUTPATIENT
Start: 2019-01-01 | End: 2019-01-01 | Stop reason: SINTOL

## 2019-01-01 RX ORDER — VALSARTAN 160 MG/1
160 TABLET ORAL DAILY
Qty: 90 TABLET | Refills: 3 | Status: SHIPPED | OUTPATIENT
Start: 2019-01-01 | End: 2020-01-01 | Stop reason: SDUPTHER

## 2019-01-01 RX ORDER — SIMVASTATIN 20 MG
20 TABLET ORAL NIGHTLY
Qty: 90 TABLET | Refills: 3 | Status: SHIPPED | OUTPATIENT
Start: 2019-01-01 | End: 2020-01-01 | Stop reason: SDUPTHER

## 2019-01-01 RX ORDER — AMLODIPINE BESYLATE 2.5 MG/1
2.5 TABLET ORAL DAILY
Qty: 90 TABLET | Refills: 2 | Status: SHIPPED | OUTPATIENT
Start: 2019-01-01 | End: 2020-01-01 | Stop reason: SDUPTHER

## 2019-01-01 RX ORDER — METOPROLOL SUCCINATE 25 MG/1
25 TABLET, EXTENDED RELEASE ORAL DAILY
Qty: 90 TABLET | Refills: 3 | Status: SHIPPED | OUTPATIENT
Start: 2019-01-01 | End: 2020-01-01 | Stop reason: SDUPTHER

## 2019-01-01 RX ADMIN — TRIAMCINOLONE ACETONIDE 80 MG: 40 INJECTION, SUSPENSION INTRA-ARTICULAR; INTRAMUSCULAR at 14:17

## 2019-01-01 RX ADMIN — LIDOCAINE HYDROCHLORIDE 8 ML: 10 INJECTION, SOLUTION EPIDURAL; INFILTRATION; INTRACAUDAL; PERINEURAL at 14:17

## 2019-02-15 NOTE — PROGRESS NOTES
Date of Office Visit: 02/15/2019  Encounter Provider: Herminia Neal MD  Place of Service: [facility] CARDIOLOGY  Patient Name: Vero Pagan  :1955      Patient ID:  Vero Pagan is a 64 y.o. female is here for  followup for HTN        History of Present Illness    She has a known history of mild coronary artery disease and hypertension. She has a strong  family history of cardiovascular disease. She has had multiple stress studies in the past.  Her last stress echocardiogram 2011 at [facility] was normal showing  no ischemia.      She had a cardiac catheterization done 2009 showing mild nonobstructive coronary  artery disease with 20% left main stenosis, 30% diffuse LAD stenosis, 40% ostial OM-1  stenosis, 30 to 40% diffuse RCA stenosis, and ejection fraction of 62%.     She is known to have diabetes mellitus, hypertension, and hyperlipidemia. She has a  history of gastroesophageal reflux disease.       In 2015 she came in with chest pain which was happening at rest and with exertion.  We set her up for a stress nuclear perfusion study which was done 2015 and this showed no evidence of ischemia.     She had chest pain 2018.  She had a nonischemic stress nuclear perfusion study done 2018.  She did have a hypertensive response to exercise.  She also had an echocardiogram done which showed ejection fraction 56% with grade 2 diastolic dysfunction, thickened aortic valve without stenosis or insufficiency.      She had laboratory values done 19 showing normal CMP, normal TSH, HDL 69, LDL 90, normal CBC.  Cardiac catheterization done for chest pain 19 at [facility] showed normal left main with mild calcification, 30% mid vessel LAD stenosis, 40% first OM stenosis, patent circumflex, normal FFR of the first OM, 20% diffuse RCA stenosis.  She has been treated medically.  Likely her chest pain was a panic attack.  She has  had no further chest pain.  She has had no tachycardia, dizziness or syncope.  Her breathing is good.  She has had no nausea.       Past Medical History:   Diagnosis Date   • Abdominal pain, RUQ    • Acute sinusitis    • Allergic    • Anterior chest wall pain    • Benign essential hypertension    • Chest pain    • Dizziness    • Fibrocystic disease of breast    • Fracture of wrist    • History of bone density study     osteopenia in left hip   • History of fibrocystic disease of breast    • History of hypertension    • History of mammogram    • History of menopause    • History of type 2 diabetes mellitus    • History of vitamin D deficiency    • Lower back pain    • Lumbosacral disc disease    • Lump or mass in breast    • Menopausal symptoms    • Plantar fasciitis of left foot    • Thumb pain    • Type 2 diabetes mellitus (CMS/HCC)    • Urinary frequency    • Vertigo    • Vitamin D deficiency disease          Past Surgical History:   Procedure Laterality Date   • BACK SURGERY     • COLONOSCOPY      normal with polyps   • HYSTERECTOMY     • PAP SMEAR      normal   • TONSILLECTOMY      x2       Current Outpatient Medications on File Prior to Visit   Medication Sig Dispense Refill   • aspirin 81 MG tablet Take 1 tablet by mouth Daily.     • BL CALCIUM-MAGNESIUM-ZINC PO Take  by mouth.     • Cholecalciferol (VITAMIN D3) 5000 UNITS tablet Take  by mouth Daily.     • cyanocobalamin (VITAMIN B-12) 250 MCG tablet Take  by mouth.     • estradiol (CLIMARA) 0.05 MG/24HR patch Place 1 patch on the skin 1 (One) Time Per Week.     • Flaxseed, Linseed, (FLAXSEED OIL) 1000 MG capsule Take  by mouth Daily.     • folic acid (FOLVITE) 1 MG tablet Take 1 mg by mouth Daily.     • losartan (COZAAR) 50 MG tablet TAKE 1 TABLET BY MOUTH DAILY 90 tablet 0   • metFORMIN (GLUCOPHAGE) 500 MG tablet Take 500 mg by mouth Daily With Breakfast.     • metoprolol succinate XL (TOPROL-XL) 25 MG 24 hr tablet Take 1 tablet by mouth Daily. 90 tablet 3    • Omega-3 Fatty Acids (FISH OIL PO) Take 1,000 mg by mouth Daily.     • omeprazole (priLOSEC) 40 MG capsule Take 1 capsule by mouth Daily. 90 capsule 3   • polyethylene glycol (MIRALAX) packet Take 17 g by mouth Daily. 30 packet 12   • ranitidine (ZANTAC) 300 MG capsule Take 2 capsules by mouth Daily.     • simvastatin (ZOCOR) 20 MG tablet Take 1 tablet by mouth Every Night. 90 tablet 3   • VITAMIN K PO Take 1 tablet by mouth Daily.     • amLODIPine (NORVASC) 2.5 MG tablet Take 1 tablet by mouth Daily. 90 tablet 3   • hydrochlorothiazide (HYDRODIURIL) 25 MG tablet Take 1 tablet by mouth Daily. 90 tablet 3   • hyoscyamine (LEVBID) 0.375 MG 12 hr tablet Take 1 tablet by mouth Every 12 (Twelve) Hours As Needed for Cramping. 60 tablet 12     No current facility-administered medications on file prior to visit.        Social History     Socioeconomic History   • Marital status:      Spouse name: Not on file   • Number of children: Not on file   • Years of education: Not on file   • Highest education level: Not on file   Social Needs   • Financial resource strain: Not on file   • Food insecurity - worry: Not on file   • Food insecurity - inability: Not on file   • Transportation needs - medical: Not on file   • Transportation needs - non-medical: Not on file   Occupational History   • Not on file   Tobacco Use   • Smoking status: Former Smoker   • Smokeless tobacco: Never Used   • Tobacco comment: caffeine use   Substance and Sexual Activity   • Alcohol use: Yes     Comment: being a social drinker   • Drug use: No   • Sexual activity: Defer   Other Topics Concern   • Not on file   Social History Narrative   • Not on file           Review of Systems   Constitution: Negative.   HENT: Negative for congestion.    Eyes: Negative for vision loss in left eye and vision loss in right eye.   Respiratory: Negative.  Negative for cough, hemoptysis, shortness of breath, sleep disturbances due to breathing, snoring, sputum  "production and wheezing.    Endocrine: Negative.    Hematologic/Lymphatic: Negative.    Skin: Negative for poor wound healing and rash.   Musculoskeletal: Negative for falls, gout, muscle cramps and myalgias.   Gastrointestinal: Negative for abdominal pain, diarrhea, dysphagia, hematemesis, melena, nausea and vomiting.   Neurological: Negative for excessive daytime sleepiness, dizziness, headaches, light-headedness, loss of balance, seizures and vertigo.   Psychiatric/Behavioral: Negative for depression and substance abuse. The patient is not nervous/anxious.        Procedures    ECG 12 Lead  Date/Time: 2/15/2019 1:11 PM  Performed by: Herminia Neal MD  Authorized by: Herminia Neal MD   Comparison: compared with previous ECG   Similar to previous ECG  Rhythm: sinus rhythm  QRS axis: left    Clinical impression: non-specific ECG                Objective:      Vitals:    02/15/19 1255   BP: 120/76   BP Location: Right arm   Patient Position: Sitting   Pulse: 67   Weight: 87.8 kg (193 lb 8 oz)   Height: 177.8 cm (70\")     Body mass index is 27.76 kg/m².    Physical Exam   Constitutional: She is oriented to person, place, and time. She appears well-developed and well-nourished. No distress.   HENT:   Head: Normocephalic and atraumatic.   Eyes: Conjunctivae are normal. No scleral icterus.   Neck: Neck supple. No JVD present. Carotid bruit is not present. No thyromegaly present.   Cardiovascular: Normal rate, regular rhythm, S1 normal, S2 normal, normal heart sounds and intact distal pulses.  No extrasystoles are present. PMI is not displaced. Exam reveals no gallop.   No murmur heard.  Pulses:       Carotid pulses are 2+ on the right side, and 2+ on the left side.       Radial pulses are 2+ on the right side, and 2+ on the left side.        Dorsalis pedis pulses are 2+ on the right side, and 2+ on the left side.        Posterior tibial pulses are 2+ on the right side, and 2+ on the left side. "   Pulmonary/Chest: Effort normal and breath sounds normal. No respiratory distress. She has no wheezes. She has no rhonchi. She has no rales. She exhibits no tenderness.   Abdominal: Soft. Bowel sounds are normal. She exhibits no distension, no abdominal bruit and no mass. There is no tenderness.   Musculoskeletal: She exhibits no edema or deformity.   Lymphadenopathy:     She has no cervical adenopathy.   Neurological: She is alert and oriented to person, place, and time. No cranial nerve deficit.   Skin: Skin is warm and dry. No rash noted. She is not diaphoretic. No cyanosis. No pallor. Nails show no clubbing.   Psychiatric: She has a normal mood and affect. Judgment normal.   Vitals reviewed.      Lab Review:       Assessment:      Diagnosis Plan   1. Benign essential hypertension     2. Mixed hyperlipidemia       1. Normal stress nuclear study 2015. Normal stress nuclear 2018.    2. Leftward axis on ECG.   3. Impaired glucose tolerance. This has improved with weight loss.   4. Strong family history of premature cardiovascular disease. Her father had a myocardial  infarction at age 63 and mother had bypass grafting. Her sister had a myocardial  infarction in her 30s and  with heart failure. Another sister had coronary artery  bypass grafting at age 61. Brother  at 56 with a myocardial infarction.   5. Hyperlipidemia on simvastatin.   6. Hypertension, controlled.   7. Grade 2 diastolic dysfunction on echo 2018.        Plan:       Stressors with her  having cancer.  This is likely what caused her panic attack.  She is otherwise doing better well.  She did have mono but she is finally gotten back to the gym.  Would continue the same.  No medication changes.  See back in 1 year.

## 2019-03-12 NOTE — TELEPHONE ENCOUNTER
Pt is taking losartan 50 MG 1 tablet daily. She is concern that it will cause cancer. Is there an alternative?    PT #: [phone]    RX should be sent to [facility] pharmacy    Thanks Sunitha

## 2019-03-12 NOTE — TELEPHONE ENCOUNTER
Pt called. She said that her Rx was sent to the wrong place. She needs then  Altace 10 mg daily sent to [facility]. Not to [facility].

## 2019-03-12 NOTE — TELEPHONE ENCOUNTER
NICOLAS on pt VM.     Altace 10 MG daily has been sent in to her pharmacy and to call back if she has any further question.    Thanks Sunitha

## 2019-05-21 PROBLEM — M17.12 OSTEOARTHRITIS OF LEFT PATELLOFEMORAL JOINT: Status: ACTIVE | Noted: 2019-01-01

## 2019-05-21 NOTE — TELEPHONE ENCOUNTER
Pt is currently taking Altace 10mg daily  She fels that this is causing dizziness and would like to go back on Losartan 50 mg daily    Is this ok?    She would like this sent in to [facility]

## 2019-05-21 NOTE — PROGRESS NOTES
Subjective:     Patient ID: Vero Pagan is a 64 y.o. female.    Chief Complaint:  Left knee pain, new issue to examiner   History of Present Illness  Vero Pagan presents to clinic with a one-month history of pain at the left knee.  She is not experiencing pain today but does usually noticed the pain at the medial, lateral and anterior aspect of the knee.  Worse with ascending and descending steps rates pain at an 8 out of a 10 aching throbbing in nature at worse again not experience in pain today.  She has been exercising working on strengthening of bilateral lower extremities and upper extremities within the last several months.  Denies known injury to the left knee in the past.  Denies pain radiating up into groin or into her left hip.  Denies that she is experiencing numbness or tingling radiating down the left lower extremity.  She has not previously had x-ray MRI or CT of the left knee in the past.  She has not tried bracing or anti-inflammatory medications.  She is been seen in the office in the past for this APRN approximately 2 years ago for a separate issue however the knee issue is new to this examiner.  She does experience pain also at the posterior joint line but only with palpation.  Denies that she is experiencing pain at the calf and all other concerns present at this time.       Social History     Occupational History   • Not on file   Tobacco Use   • Smoking status: Former Smoker   • Smokeless tobacco: Never Used   • Tobacco comment: caffeine use   Substance and Sexual Activity   • Alcohol use: Yes     Comment: being a social drinker   • Drug use: No   • Sexual activity: Defer      Past Medical History:   Diagnosis Date   • Abdominal pain, RUQ    • Acute sinusitis    • Allergic    • Anterior chest wall pain    • Benign essential hypertension    • Chest pain    • Dizziness    • Fibrocystic disease of breast    • Fracture of wrist    • History of bone density study     osteopenia in left hip   •  History of fibrocystic disease of breast    • History of hypertension    • History of mammogram    • History of menopause    • History of type 2 diabetes mellitus    • History of vitamin D deficiency    • Lower back pain    • Lumbosacral disc disease    • Lump or mass in breast    • Menopausal symptoms    • Plantar fasciitis of left foot    • Thumb pain    • Type 2 diabetes mellitus (CMS/HCC)    • Urinary frequency    • Vertigo    • Vitamin D deficiency disease      Past Surgical History:   Procedure Laterality Date   • BACK SURGERY     • COLONOSCOPY      normal with polyps   • HYSTERECTOMY     • PAP SMEAR      normal   • TONSILLECTOMY      x2       Family History   Problem Relation Age of Onset   • Heart disease Mother    • Cancer Mother         lung cancer   • Heart disease Father    • Diabetes Other    • Heart disease Other    • Ulcerative colitis Sister    • Colon cancer Neg Hx    • Colon polyps Neg Hx          Review of Systems   Constitutional: Negative for chills, diaphoresis, fever and unexpected weight change.   HENT: Negative for hearing loss, nosebleeds, sore throat and tinnitus.    Eyes: Negative for pain and visual disturbance.   Respiratory: Negative for cough, shortness of breath and wheezing.    Cardiovascular: Negative for chest pain and palpitations.   Gastrointestinal: Negative for abdominal pain, diarrhea, nausea and vomiting.   Endocrine: Negative for cold intolerance, heat intolerance and polydipsia.   Genitourinary: Negative for difficulty urinating, dysuria and hematuria.   Musculoskeletal: Positive for arthralgias. Negative for joint swelling and myalgias.   Skin: Negative for rash and wound.   Allergic/Immunologic: Negative for environmental allergies.   Neurological: Negative for dizziness, syncope and numbness.   Hematological: Does not bruise/bleed easily.   Psychiatric/Behavioral: Negative for dysphoric mood and sleep disturbance. The patient is not nervous/anxious.             Objective:  Physical Exam    General: No acute distress.  Eyes: conjunctiva clear; pupils equally round and reactive  ENT: external ears and nose atraumatic; oropharynx clear  CV: no peripheral edema  Resp: normal respiratory effort  Skin: no rashes or wounds; normal turgor  Psych: mood and affect appropriate; recent and remote memory intact    There were no vitals filed for this visit.  There were no vitals filed for this visit.  There is no height or weight on file to calculate BMI.      Left Knee Exam     Range of Motion   Extension: 0   Flexion: 130     Tests   Ron:  Medial - negative Lateral - negative  Varus: negative Valgus: negative  Lachman:  Anterior - 1+    Posterior - negative  Drawer:  Anterior - negative       Patellar apprehension: negative    Other   Erythema: absent  Scars: absent  Sensation: normal  Pulse: present  Swelling: mild  Effusion: no effusion present    Comments:  Palpable Bakers cyst noted at posterior joint line  Negative active patellar compression test  Positive crepitus there are commission               Imaging:  Left Knee X-Ray  Indication: Pain    AP, Lateral, and [place] views    Findings:  No fracture  No bony lesion  Normal soft tissues  Patellofemoral joint narrowing with osteophyte formation present superior and inferior patellar poles    No prior studies were available for comparison.    Assessment:        1. Pain    2. Osteoarthritis of left patellofemoral joint           Plan:  1.  Discussed plan of care with patient.  Wish to proceed with corticosteroid injection the left knee.  Provided her with home strengthening exercises and stretching exercises that she is able to complete along with the gym exercises she is already completing.  Plan to see her back in approximately 6 weeks or as needed.  Patient verbalized understanding of all information agrees plan of care.  Denies other concerns present at this time.  Large Joint Arthrocentesis: L knee  Date/Time:  5/21/2019 2:17 PM  Consent given by: patient  Site marked: site marked  Timeout: Immediately prior to procedure a time out was called to verify the correct patient, procedure, equipment, support staff and site/side marked as required   Supporting Documentation  Indications: pain   Procedure Details  Location: knee - L knee  Preparation: Patient was prepped and draped in the usual sterile fashion  Needle size: 22 G  Approach: superior  Medications administered: 8 mL lidocaine PF 1% 1 %; 80 mg triamcinolone acetonide 40 MG/ML  Patient tolerance: patient tolerated the procedure well with no immediate complications          Orders:  Orders Placed This Encounter   Procedures   • Large Joint Arthrocentesis: L knee   • XR Knee 3+ View With [place] Left       I ordered and reviewed the YOLIS today.     Dictated utilizing Dragon dictation

## 2019-11-19 NOTE — TELEPHONE ENCOUNTER
Called and /m for pt.....Marianne DE LA PAZ  
Pt called and states that she received at letter from her insurance that her losartan 50 mg QD has ingredients for a lot of bad side effect. Pt wants to know if she you can Rx another medication? Pt tried ramipril before but she got sick after taking the medication.  
Sent in valsartan - stop losartan  
Patent

## 2020-01-01 RX ORDER — METOPROLOL SUCCINATE 25 MG/1
25 TABLET, EXTENDED RELEASE ORAL DAILY
Qty: 90 TABLET | Refills: 0 | Status: SHIPPED | OUTPATIENT
Start: 2020-01-01

## 2020-01-01 RX ORDER — VALSARTAN 160 MG/1
160 TABLET ORAL DAILY
Qty: 90 TABLET | Refills: 0 | Status: SHIPPED | OUTPATIENT
Start: 2020-01-01

## 2020-01-01 RX ORDER — SIMVASTATIN 20 MG
20 TABLET ORAL NIGHTLY
Qty: 90 TABLET | Refills: 0 | Status: SHIPPED | OUTPATIENT
Start: 2020-01-01

## 2020-01-01 RX ORDER — AMLODIPINE BESYLATE 2.5 MG/1
2.5 TABLET ORAL DAILY
Qty: 90 TABLET | Refills: 0 | Status: SHIPPED | OUTPATIENT
Start: 2020-01-01